# Patient Record
Sex: MALE | Race: WHITE | NOT HISPANIC OR LATINO | Employment: OTHER | URBAN - METROPOLITAN AREA
[De-identification: names, ages, dates, MRNs, and addresses within clinical notes are randomized per-mention and may not be internally consistent; named-entity substitution may affect disease eponyms.]

---

## 2023-02-13 PROBLEM — H04.123 BILATERAL DRY EYES: Status: ACTIVE | Noted: 2023-02-13

## 2023-02-13 PROBLEM — I71.40 AAA (ABDOMINAL AORTIC ANEURYSM) (CMS-HCC): Status: ACTIVE | Noted: 2023-02-13

## 2023-02-13 PROBLEM — H53.40 VISUAL FIELD DEFECT: Status: ACTIVE | Noted: 2023-02-13

## 2023-02-13 PROBLEM — R53.83 OTHER FATIGUE: Status: ACTIVE | Noted: 2023-02-13

## 2023-02-13 PROBLEM — R00.2 PALPITATIONS: Status: ACTIVE | Noted: 2023-02-13

## 2023-02-13 PROBLEM — R93.89 ABNORMAL CXR: Status: ACTIVE | Noted: 2023-02-13

## 2023-02-13 PROBLEM — N39.0 RECURRENT UTI: Status: ACTIVE | Noted: 2023-02-13

## 2023-02-13 PROBLEM — E03.9 ACQUIRED HYPOTHYROIDISM: Status: ACTIVE | Noted: 2023-02-13

## 2023-02-13 PROBLEM — I25.10 ASHD (ARTERIOSCLEROTIC HEART DISEASE): Status: ACTIVE | Noted: 2023-02-13

## 2023-02-13 PROBLEM — I48.91 AFIB (MULTI): Status: ACTIVE | Noted: 2023-02-13

## 2023-02-13 PROBLEM — R79.89 ABNORMAL CBC: Status: ACTIVE | Noted: 2023-02-13

## 2023-02-13 PROBLEM — J90 PLEURAL EFFUSION ON LEFT: Status: ACTIVE | Noted: 2023-02-13

## 2023-02-13 PROBLEM — F32.A DEPRESSIVE DISORDER: Status: ACTIVE | Noted: 2023-02-13

## 2023-02-13 PROBLEM — Z86.73 CEREBELLAR CEREBROVASCULAR ACCIDENT (CVA) WITHOUT LATE EFFECT: Status: ACTIVE | Noted: 2023-02-13

## 2023-02-13 PROBLEM — I10 HTN (HYPERTENSION), BENIGN: Status: ACTIVE | Noted: 2023-02-13

## 2023-02-13 PROBLEM — D49.7 PITUITARY NEOPLASM: Status: ACTIVE | Noted: 2023-02-13

## 2023-02-13 PROBLEM — N18.30 CKD (CHRONIC KIDNEY DISEASE) STAGE 3, GFR 30-59 ML/MIN (MULTI): Status: ACTIVE | Noted: 2023-02-13

## 2023-02-13 PROBLEM — R26.0 ATAXIC GAIT: Status: ACTIVE | Noted: 2023-02-13

## 2023-02-13 PROBLEM — H47.019: Status: ACTIVE | Noted: 2023-02-13

## 2023-02-13 PROBLEM — H26.8 CATARACT, MATURE: Status: ACTIVE | Noted: 2023-02-13

## 2023-02-13 PROBLEM — R26.2 AMBULATORY DYSFUNCTION: Status: ACTIVE | Noted: 2023-02-13

## 2023-02-13 PROBLEM — K21.9 ESOPHAGEAL REFLUX: Status: ACTIVE | Noted: 2023-02-13

## 2023-02-13 PROBLEM — E78.5 HYPERLIPIDEMIA, ACQUIRED: Status: ACTIVE | Noted: 2023-02-13

## 2023-02-13 PROBLEM — N13.8 BPH WITH OBSTRUCTION/LOWER URINARY TRACT SYMPTOMS: Status: ACTIVE | Noted: 2023-02-13

## 2023-02-13 PROBLEM — N40.1 BPH WITH OBSTRUCTION/LOWER URINARY TRACT SYMPTOMS: Status: ACTIVE | Noted: 2023-02-13

## 2023-02-13 RX ORDER — TAMSULOSIN HYDROCHLORIDE 0.4 MG/1
1 CAPSULE ORAL DAILY
COMMUNITY
Start: 2022-06-03 | End: 2023-07-11 | Stop reason: SDUPTHER

## 2023-02-13 RX ORDER — SERTRALINE HYDROCHLORIDE 50 MG/1
1 TABLET, FILM COATED ORAL DAILY
COMMUNITY
Start: 2020-11-24 | End: 2024-03-11 | Stop reason: ALTCHOICE

## 2023-02-13 RX ORDER — LEVOTHYROXINE SODIUM 50 UG/1
1 TABLET ORAL DAILY
COMMUNITY
Start: 2017-11-10 | End: 2024-04-22

## 2023-02-13 RX ORDER — LANOLIN ALCOHOL/MO/W.PET/CERES
1 CREAM (GRAM) TOPICAL DAILY
COMMUNITY
Start: 2019-08-14

## 2023-02-13 RX ORDER — METOPROLOL SUCCINATE 50 MG/1
1 TABLET, EXTENDED RELEASE ORAL DAILY
COMMUNITY
Start: 2019-05-17 | End: 2024-04-19

## 2023-02-13 RX ORDER — ROSUVASTATIN CALCIUM 40 MG/1
1 TABLET, COATED ORAL DAILY
COMMUNITY
Start: 2021-06-28 | End: 2023-07-11 | Stop reason: SDUPTHER

## 2023-02-13 RX ORDER — LOTEPREDNOL ETABONATE 5 MG/ML
SUSPENSION/ DROPS OPHTHALMIC
COMMUNITY
Start: 2022-02-11

## 2023-02-13 RX ORDER — AMLODIPINE BESYLATE 5 MG/1
1 TABLET ORAL DAILY
COMMUNITY
Start: 2019-08-14 | End: 2023-07-11 | Stop reason: ALTCHOICE

## 2023-02-13 RX ORDER — UBIDECARENONE 75 MG
1 CAPSULE ORAL DAILY
COMMUNITY
Start: 2019-08-14

## 2023-02-13 RX ORDER — DILTIAZEM HYDROCHLORIDE 240 MG/1
1 CAPSULE, EXTENDED RELEASE ORAL DAILY
COMMUNITY
Start: 2019-05-17 | End: 2023-07-11 | Stop reason: SDUPTHER

## 2023-02-13 RX ORDER — NITROGLYCERIN 0.4 MG/1
TABLET SUBLINGUAL
COMMUNITY
Start: 2021-05-20 | End: 2024-03-11 | Stop reason: SDUPTHER

## 2023-02-13 RX ORDER — FERROUS SULFATE 325(65) MG
2 TABLET ORAL DAILY
COMMUNITY
Start: 2019-05-17 | End: 2024-03-11 | Stop reason: ALTCHOICE

## 2023-02-13 RX ORDER — PANTOPRAZOLE SODIUM 20 MG/1
1 TABLET, DELAYED RELEASE ORAL DAILY
COMMUNITY
Start: 2019-08-14 | End: 2023-07-24 | Stop reason: SDUPTHER

## 2023-03-09 ENCOUNTER — TELEPHONE (OUTPATIENT)
Dept: PRIMARY CARE | Facility: CLINIC | Age: 83
End: 2023-03-09
Payer: MEDICARE

## 2023-04-11 ENCOUNTER — OFFICE VISIT (OUTPATIENT)
Dept: PRIMARY CARE | Facility: CLINIC | Age: 83
End: 2023-04-11
Payer: MEDICARE

## 2023-04-11 VITALS
HEIGHT: 70 IN | HEART RATE: 60 BPM | SYSTOLIC BLOOD PRESSURE: 122 MMHG | OXYGEN SATURATION: 98 % | DIASTOLIC BLOOD PRESSURE: 72 MMHG | WEIGHT: 203.5 LBS | BODY MASS INDEX: 29.13 KG/M2 | TEMPERATURE: 98.1 F

## 2023-04-11 DIAGNOSIS — I48.91 ATRIAL FIBRILLATION, UNSPECIFIED TYPE (MULTI): ICD-10-CM

## 2023-04-11 DIAGNOSIS — I25.10 ASHD (ARTERIOSCLEROTIC HEART DISEASE): ICD-10-CM

## 2023-04-11 DIAGNOSIS — E78.5 HYPERLIPIDEMIA, ACQUIRED: ICD-10-CM

## 2023-04-11 DIAGNOSIS — I71.40 ABDOMINAL AORTIC ANEURYSM (AAA) WITHOUT RUPTURE, UNSPECIFIED PART (CMS-HCC): ICD-10-CM

## 2023-04-11 DIAGNOSIS — I10 HTN (HYPERTENSION), BENIGN: Primary | ICD-10-CM

## 2023-04-11 DIAGNOSIS — N18.31 STAGE 3A CHRONIC KIDNEY DISEASE (MULTI): ICD-10-CM

## 2023-04-11 DIAGNOSIS — I47.19 PAROXYSMAL ATRIAL TACHYCARDIA (CMS-HCC): ICD-10-CM

## 2023-04-11 DIAGNOSIS — D64.9 ANEMIA, UNSPECIFIED TYPE: ICD-10-CM

## 2023-04-11 DIAGNOSIS — J44.9 CHRONIC OBSTRUCTIVE PULMONARY DISEASE, UNSPECIFIED COPD TYPE (MULTI): ICD-10-CM

## 2023-04-11 DIAGNOSIS — D35.2 PITUITARY MACROADENOMA (MULTI): ICD-10-CM

## 2023-04-11 DIAGNOSIS — I63.9 CEREBELLAR INFARCTION (MULTI): ICD-10-CM

## 2023-04-11 DIAGNOSIS — E03.9 ACQUIRED HYPOTHYROIDISM: ICD-10-CM

## 2023-04-11 PROBLEM — H04.123 BILATERAL DRY EYES: Status: RESOLVED | Noted: 2023-02-13 | Resolved: 2023-04-11

## 2023-04-11 PROBLEM — R26.2 AMBULATORY DYSFUNCTION: Status: RESOLVED | Noted: 2023-02-13 | Resolved: 2023-04-11

## 2023-04-11 PROBLEM — J90 PLEURAL EFFUSION ON LEFT: Status: RESOLVED | Noted: 2023-02-13 | Resolved: 2023-04-11

## 2023-04-11 PROBLEM — F10.10 ALCOHOL ABUSE: Status: RESOLVED | Noted: 2019-04-12 | Resolved: 2023-04-11

## 2023-04-11 PROBLEM — I21.3 ACUTE ST ELEVATION MYOCARDIAL INFARCTION (STEMI) (MULTI): Status: RESOLVED | Noted: 2019-04-12 | Resolved: 2023-04-11

## 2023-04-11 PROBLEM — D49.7 PITUITARY NEOPLASM: Status: RESOLVED | Noted: 2023-02-13 | Resolved: 2023-04-11

## 2023-04-11 PROBLEM — R00.2 PALPITATIONS: Status: RESOLVED | Noted: 2023-02-13 | Resolved: 2023-04-11

## 2023-04-11 PROBLEM — F32.A DEPRESSIVE DISORDER: Status: RESOLVED | Noted: 2023-02-13 | Resolved: 2023-04-11

## 2023-04-11 PROBLEM — I50.9 CHF (CONGESTIVE HEART FAILURE) (MULTI): Status: RESOLVED | Noted: 2019-05-23 | Resolved: 2023-04-11

## 2023-04-11 PROCEDURE — 1160F RVW MEDS BY RX/DR IN RCRD: CPT | Performed by: INTERNAL MEDICINE

## 2023-04-11 PROCEDURE — 99213 OFFICE O/P EST LOW 20 MIN: CPT | Performed by: INTERNAL MEDICINE

## 2023-04-11 PROCEDURE — 3074F SYST BP LT 130 MM HG: CPT | Performed by: INTERNAL MEDICINE

## 2023-04-11 PROCEDURE — 1159F MED LIST DOCD IN RCRD: CPT | Performed by: INTERNAL MEDICINE

## 2023-04-11 PROCEDURE — 3078F DIAST BP <80 MM HG: CPT | Performed by: INTERNAL MEDICINE

## 2023-04-11 RX ORDER — ASPIRIN 81 MG/1
81 TABLET ORAL DAILY
COMMUNITY
End: 2024-03-11 | Stop reason: ALTCHOICE

## 2023-04-11 NOTE — PROGRESS NOTES
"Subjective   Patient ID: Alfredo Quigley is a 82 y.o. male who presents for Follow-up.    HPI 2 weeks ago went to er w/ cp from gerd. Not admitted. Work up neg for cardiac. On ppi . Follow up gerd, hyperlipidemia, ashd, h/o cva, anemia, bph    Review of Systems  No cp, sob  Sees cardio Friday    Objective   /72   Pulse 60   Temp 36.7 °C (98.1 °F)   Ht 1.778 m (5' 10\")   Wt 92.3 kg (203 lb 8 oz)   SpO2 98%   BMI 29.20 kg/m²     Physical Exam  Gen nad, affect wnl  Heentt eomfg, face symmetric, ncat  Neck w/o jvd  Lungs clear   Cv rrr nl s1, s2  Ext w/o edema  Neuro grossly nonfocal  Skin good color    Assessment/Plan   Diagnoses and all orders for this visit:  HTN (hypertension), benign  -     Comprehensive metabolic panel; Future  ASHD (arteriosclerotic heart disease)  -     Lipid Panel; Future  Abdominal aortic aneurysm (AAA) without rupture, unspecified part (CMS/HCC)  Atrial fibrillation, unspecified type (CMS/HCC)  -     Tsh With Reflex To Free T4 If Abnormal; Future  Chronic obstructive pulmonary disease, unspecified COPD type (CMS/HCC)  -     Tsh With Reflex To Free T4 If Abnormal; Future  Pituitary macroadenoma (CMS/HCC)  Cerebellar infarction (CMS/HCC)  -     CBC and Auto Differential; Future  Paroxysmal atrial tachycardia (CMS/HCC)  Stage 3a chronic kidney disease  -     CBC and Auto Differential; Future  Acquired hypothyroidism  Hyperlipidemia, acquired  Anemia, unspecified type  -     Iron and TIBC; Future  -     Ferritin; Future       "

## 2023-04-14 LAB
ALANINE AMINOTRANSFERASE (SGPT) (U/L) IN SER/PLAS: 12 U/L (ref 10–52)
ALBUMIN (G/DL) IN SER/PLAS: 4 G/DL (ref 3.4–5)
ALBUMIN (G/DL) IN SER/PLAS: 4.2 G/DL (ref 3.4–5)
ALKALINE PHOSPHATASE (U/L) IN SER/PLAS: 105 U/L (ref 33–136)
ANION GAP IN SER/PLAS: 13 MMOL/L (ref 10–20)
ANION GAP IN SER/PLAS: 14 MMOL/L (ref 10–20)
ASPARTATE AMINOTRANSFERASE (SGOT) (U/L) IN SER/PLAS: 10 U/L (ref 9–39)
BASOPHILS (10*3/UL) IN BLOOD BY AUTOMATED COUNT: 0.06 X10E9/L (ref 0–0.1)
BASOPHILS/100 LEUKOCYTES IN BLOOD BY AUTOMATED COUNT: 0.9 % (ref 0–2)
BILIRUBIN TOTAL (MG/DL) IN SER/PLAS: 0.5 MG/DL (ref 0–1.2)
CALCIDIOL (25 OH VITAMIN D3) (NG/ML) IN SER/PLAS: 45 NG/ML
CALCIUM (MG/DL) IN SER/PLAS: 9.3 MG/DL (ref 8.6–10.6)
CALCIUM (MG/DL) IN SER/PLAS: 9.5 MG/DL (ref 8.6–10.6)
CARBON DIOXIDE, TOTAL (MMOL/L) IN SER/PLAS: 27 MMOL/L (ref 21–32)
CARBON DIOXIDE, TOTAL (MMOL/L) IN SER/PLAS: 29 MMOL/L (ref 21–32)
CHLORIDE (MMOL/L) IN SER/PLAS: 104 MMOL/L (ref 98–107)
CHLORIDE (MMOL/L) IN SER/PLAS: 104 MMOL/L (ref 98–107)
CHOLESTEROL (MG/DL) IN SER/PLAS: 160 MG/DL (ref 0–199)
CHOLESTEROL IN HDL (MG/DL) IN SER/PLAS: 38.1 MG/DL
CHOLESTEROL/HDL RATIO: 4.2
CREATININE (MG/DL) IN SER/PLAS: 2.04 MG/DL (ref 0.5–1.3)
CREATININE (MG/DL) IN SER/PLAS: 2.04 MG/DL (ref 0.5–1.3)
EOSINOPHILS (10*3/UL) IN BLOOD BY AUTOMATED COUNT: 0.26 X10E9/L (ref 0–0.4)
EOSINOPHILS/100 LEUKOCYTES IN BLOOD BY AUTOMATED COUNT: 3.7 % (ref 0–6)
ERYTHROCYTE DISTRIBUTION WIDTH (RATIO) BY AUTOMATED COUNT: 14.2 % (ref 11.5–14.5)
ERYTHROCYTE MEAN CORPUSCULAR HEMOGLOBIN CONCENTRATION (G/DL) BY AUTOMATED: 31.2 G/DL (ref 32–36)
ERYTHROCYTE MEAN CORPUSCULAR VOLUME (FL) BY AUTOMATED COUNT: 84 FL (ref 80–100)
ERYTHROCYTES (10*6/UL) IN BLOOD BY AUTOMATED COUNT: 4.39 X10E12/L (ref 4.5–5.9)
FERRITIN (UG/LL) IN SER/PLAS: 86 UG/L (ref 20–300)
GFR MALE: 32 ML/MIN/1.73M2
GFR MALE: 32 ML/MIN/1.73M2
GLUCOSE (MG/DL) IN SER/PLAS: 91 MG/DL (ref 74–99)
GLUCOSE (MG/DL) IN SER/PLAS: 95 MG/DL (ref 74–99)
HEMATOCRIT (%) IN BLOOD BY AUTOMATED COUNT: 36.3 % (ref 41–52)
HEMATOCRIT (%) IN BLOOD BY AUTOMATED COUNT: 36.9 % (ref 41–52)
HEMOGLOBIN (G/DL) IN BLOOD: 11.2 G/DL (ref 13.5–17.5)
HEMOGLOBIN (G/DL) IN BLOOD: 11.5 G/DL (ref 13.5–17.5)
IMMATURE GRANULOCYTES/100 LEUKOCYTES IN BLOOD BY AUTOMATED COUNT: 0.3 % (ref 0–0.9)
IRON (UG/DL) IN SER/PLAS: 47 UG/DL (ref 35–150)
IRON BINDING CAPACITY (UG/DL) IN SER/PLAS: 311 UG/DL (ref 240–445)
IRON SATURATION (%) IN SER/PLAS: 15 % (ref 25–45)
LDL: 92 MG/DL (ref 0–99)
LEUKOCYTES (10*3/UL) IN BLOOD BY AUTOMATED COUNT: 7 X10E9/L (ref 4.4–11.3)
LYMPHOCYTES (10*3/UL) IN BLOOD BY AUTOMATED COUNT: 1.23 X10E9/L (ref 0.8–3)
LYMPHOCYTES/100 LEUKOCYTES IN BLOOD BY AUTOMATED COUNT: 17.5 % (ref 13–44)
MONOCYTES (10*3/UL) IN BLOOD BY AUTOMATED COUNT: 0.52 X10E9/L (ref 0.05–0.8)
MONOCYTES/100 LEUKOCYTES IN BLOOD BY AUTOMATED COUNT: 7.4 % (ref 2–10)
NEUTROPHILS (10*3/UL) IN BLOOD BY AUTOMATED COUNT: 4.92 X10E9/L (ref 1.6–5.5)
NEUTROPHILS/100 LEUKOCYTES IN BLOOD BY AUTOMATED COUNT: 70.2 % (ref 40–80)
NRBC (PER 100 WBCS) BY AUTOMATED COUNT: 0 /100 WBC (ref 0–0)
PARATHYRIN INTACT (PG/ML) IN SER/PLAS: 49.2 PG/ML (ref 18.5–88)
PHOSPHATE (MG/DL) IN SER/PLAS: 3.5 MG/DL (ref 2.5–4.9)
PLATELETS (10*3/UL) IN BLOOD AUTOMATED COUNT: 226 X10E9/L (ref 150–450)
POTASSIUM (MMOL/L) IN SER/PLAS: 4.1 MMOL/L (ref 3.5–5.3)
POTASSIUM (MMOL/L) IN SER/PLAS: 4.2 MMOL/L (ref 3.5–5.3)
PROTEIN TOTAL: 7.2 G/DL (ref 6.4–8.2)
SODIUM (MMOL/L) IN SER/PLAS: 141 MMOL/L (ref 136–145)
SODIUM (MMOL/L) IN SER/PLAS: 142 MMOL/L (ref 136–145)
TRIGLYCERIDE (MG/DL) IN SER/PLAS: 150 MG/DL (ref 0–149)
UREA NITROGEN (MG/DL) IN SER/PLAS: 21 MG/DL (ref 6–23)
UREA NITROGEN (MG/DL) IN SER/PLAS: 22 MG/DL (ref 6–23)
VLDL: 30 MG/DL (ref 0–40)

## 2023-04-15 LAB
APPEARANCE, URINE: CLEAR
BILIRUBIN, URINE: NEGATIVE
BLOOD, URINE: NEGATIVE
COLOR, URINE: YELLOW
CREATININE (MG/DL) IN URINE: 108 MG/DL (ref 20–370)
GLUCOSE, URINE: NEGATIVE MG/DL
KETONES, URINE: NEGATIVE MG/DL
LEUKOCYTE ESTERASE, URINE: NEGATIVE
NITRITE, URINE: NEGATIVE
PH, URINE: 6 (ref 5–8)
PROTEIN (MG/DL) IN URINE: 30 MG/DL (ref 5–25)
PROTEIN, URINE: NEGATIVE MG/DL
PROTEIN/CREATININE (MG/MG) IN URINE: 0.28 MG/MG CREAT (ref 0–0.17)
SPECIFIC GRAVITY, URINE: 1.01 (ref 1–1.03)
UROBILINOGEN, URINE: <2 MG/DL (ref 0–1.9)

## 2023-07-11 ENCOUNTER — OFFICE VISIT (OUTPATIENT)
Dept: PRIMARY CARE | Facility: CLINIC | Age: 83
End: 2023-07-11
Payer: MEDICARE

## 2023-07-11 VITALS
OXYGEN SATURATION: 96 % | HEART RATE: 60 BPM | HEIGHT: 70 IN | SYSTOLIC BLOOD PRESSURE: 122 MMHG | TEMPERATURE: 97.3 F | WEIGHT: 203 LBS | DIASTOLIC BLOOD PRESSURE: 72 MMHG | BODY MASS INDEX: 29.06 KG/M2

## 2023-07-11 DIAGNOSIS — Z00.00 ROUTINE GENERAL MEDICAL EXAMINATION AT HEALTH CARE FACILITY: Primary | ICD-10-CM

## 2023-07-11 DIAGNOSIS — N13.8 BPH WITH OBSTRUCTION/LOWER URINARY TRACT SYMPTOMS: ICD-10-CM

## 2023-07-11 DIAGNOSIS — I48.91 ATRIAL FIBRILLATION, UNSPECIFIED TYPE (MULTI): ICD-10-CM

## 2023-07-11 DIAGNOSIS — N40.1 BPH WITH OBSTRUCTION/LOWER URINARY TRACT SYMPTOMS: ICD-10-CM

## 2023-07-11 DIAGNOSIS — E78.5 HYPERLIPIDEMIA, UNSPECIFIED HYPERLIPIDEMIA TYPE: ICD-10-CM

## 2023-07-11 DIAGNOSIS — Z86.73 CEREBELLAR CEREBROVASCULAR ACCIDENT (CVA) WITHOUT LATE EFFECT: ICD-10-CM

## 2023-07-11 PROBLEM — R97.20 BPH WITH ELEVATED PSA: Status: RESOLVED | Noted: 2023-07-11 | Resolved: 2023-07-11

## 2023-07-11 PROBLEM — N40.0 BPH WITH ELEVATED PSA: Status: RESOLVED | Noted: 2023-07-11 | Resolved: 2023-07-11

## 2023-07-11 PROCEDURE — 1159F MED LIST DOCD IN RCRD: CPT | Performed by: INTERNAL MEDICINE

## 2023-07-11 PROCEDURE — 3078F DIAST BP <80 MM HG: CPT | Performed by: INTERNAL MEDICINE

## 2023-07-11 PROCEDURE — 3074F SYST BP LT 130 MM HG: CPT | Performed by: INTERNAL MEDICINE

## 2023-07-11 PROCEDURE — 1160F RVW MEDS BY RX/DR IN RCRD: CPT | Performed by: INTERNAL MEDICINE

## 2023-07-11 PROCEDURE — 1170F FXNL STATUS ASSESSED: CPT | Performed by: INTERNAL MEDICINE

## 2023-07-11 PROCEDURE — G0439 PPPS, SUBSEQ VISIT: HCPCS | Performed by: INTERNAL MEDICINE

## 2023-07-11 PROCEDURE — 99213 OFFICE O/P EST LOW 20 MIN: CPT | Performed by: INTERNAL MEDICINE

## 2023-07-11 RX ORDER — ROSUVASTATIN CALCIUM 40 MG/1
40 TABLET, COATED ORAL DAILY
Qty: 90 TABLET | Refills: 3 | Status: SHIPPED | OUTPATIENT
Start: 2023-07-11 | End: 2024-07-10

## 2023-07-11 RX ORDER — DILTIAZEM HYDROCHLORIDE 240 MG/1
240 CAPSULE, EXTENDED RELEASE ORAL DAILY
Qty: 90 CAPSULE | Refills: 3 | Status: SHIPPED | OUTPATIENT
Start: 2023-07-11 | End: 2023-07-12 | Stop reason: SDUPTHER

## 2023-07-11 RX ORDER — AMLODIPINE BESYLATE 2.5 MG/1
2.5 TABLET ORAL DAILY
COMMUNITY
End: 2024-04-19

## 2023-07-11 RX ORDER — ROSUVASTATIN CALCIUM 40 MG/1
40 TABLET, COATED ORAL DAILY
Qty: 14 TABLET | Refills: 1 | Status: SHIPPED | OUTPATIENT
Start: 2023-07-11 | End: 2024-03-11 | Stop reason: WASHOUT

## 2023-07-11 RX ORDER — TAMSULOSIN HYDROCHLORIDE 0.4 MG/1
0.4 CAPSULE ORAL DAILY
Qty: 90 CAPSULE | Refills: 3 | Status: SHIPPED | OUTPATIENT
Start: 2023-07-11 | End: 2024-03-11 | Stop reason: SDUPTHER

## 2023-07-11 ASSESSMENT — ACTIVITIES OF DAILY LIVING (ADL)
BATHING: NEEDS ASSISTANCE
GROCERY_SHOPPING: NEEDS ASSISTANCE
DOING_HOUSEWORK: NEEDS ASSISTANCE
MANAGING_FINANCES: NEEDS ASSISTANCE
DRESSING: NEEDS ASSISTANCE
TAKING_MEDICATION: NEEDS ASSISTANCE

## 2023-07-11 ASSESSMENT — ENCOUNTER SYMPTOMS
LOSS OF SENSATION IN FEET: 0
DEPRESSION: 0
OCCASIONAL FEELINGS OF UNSTEADINESS: 1

## 2023-07-11 ASSESSMENT — PATIENT HEALTH QUESTIONNAIRE - PHQ9
1. LITTLE INTEREST OR PLEASURE IN DOING THINGS: NOT AT ALL
SUM OF ALL RESPONSES TO PHQ9 QUESTIONS 1 AND 2: 0
2. FEELING DOWN, DEPRESSED OR HOPELESS: NOT AT ALL

## 2023-07-11 NOTE — PROGRESS NOTES
"Subjective   Reason for Visit: Alfredo Quigley is an 83 y.o. male here for a Medicare Wellness visit.     Past Medical, Surgical, and Family History reviewed and updated in chart.    Reviewed all medications by prescribing practitioner or clinical pharmacist (such as prescriptions, OTCs, herbal therapies and supplements) and documented in the medical record.    HPI follow up htn, hyperlipidemia, ashd    Patient Care Team:  Jose Matos MD as PCP - General  [facility]     Review of Systems  Not using etoh. No pain anywhere.  Objective   Vitals:  /72   Pulse 60   Temp 36.3 °C (97.3 °F)   Ht 1.778 m (5' 10\")   Wt 92.1 kg (203 lb)   SpO2 96%   BMI 29.13 kg/m²       Physical Exam  GEN nad, Affect wnl  Heent ncat, face symmetric  Skin good color  Resp breathing easily  No p/m retardation or agitation  Thinking appropriate  Oriented    Assessment/Plan   Medicare wellness  Htn  Ckd  Afib  Hyperlipidemia    Follow up 6 mos w/ labs       "

## 2023-07-12 DIAGNOSIS — I48.91 ATRIAL FIBRILLATION, UNSPECIFIED TYPE (MULTI): ICD-10-CM

## 2023-07-12 RX ORDER — DILTIAZEM HYDROCHLORIDE 240 MG/1
240 CAPSULE, EXTENDED RELEASE ORAL DAILY
Qty: 90 CAPSULE | Refills: 3 | Status: SHIPPED | OUTPATIENT
Start: 2023-07-12 | End: 2023-07-24 | Stop reason: SDUPTHER

## 2023-07-17 ENCOUNTER — TELEPHONE (OUTPATIENT)
Dept: PRIMARY CARE | Facility: CLINIC | Age: 83
End: 2023-07-17
Payer: MEDICARE

## 2023-07-17 DIAGNOSIS — R41.0 CONFUSION: Primary | ICD-10-CM

## 2023-07-19 ENCOUNTER — LAB (OUTPATIENT)
Dept: LAB | Facility: LAB | Age: 83
End: 2023-07-19
Payer: MEDICARE

## 2023-07-19 DIAGNOSIS — R41.0 CONFUSION: ICD-10-CM

## 2023-07-19 LAB
APPEARANCE, URINE: ABNORMAL
BACTERIA, URINE: ABNORMAL /HPF
BILIRUBIN, URINE: NEGATIVE
BLOOD, URINE: NEGATIVE
CALCIUM OXALATE CRYSTALS, URINE: ABNORMAL /HPF
COLOR, URINE: ABNORMAL
GLUCOSE, URINE: NEGATIVE MG/DL
KETONES, URINE: NEGATIVE MG/DL
LEUKOCYTE ESTERASE, URINE: NEGATIVE
MUCUS, URINE: ABNORMAL /LPF
NITRITE, URINE: NEGATIVE
PH, URINE: 5 (ref 5–8)
PROTEIN, URINE: ABNORMAL MG/DL
RBC, URINE: 1 /HPF (ref 0–5)
SPECIFIC GRAVITY, URINE: 1.02 (ref 1–1.03)
SQUAMOUS EPITHELIAL CELLS, URINE: 1 /HPF
UROBILINOGEN, URINE: <2 MG/DL (ref 0–1.9)
WBC, URINE: 3 /HPF (ref 0–5)

## 2023-07-19 PROCEDURE — 87086 URINE CULTURE/COLONY COUNT: CPT

## 2023-07-19 PROCEDURE — 81001 URINALYSIS AUTO W/SCOPE: CPT

## 2023-07-20 LAB — URINE CULTURE: NORMAL

## 2023-07-24 DIAGNOSIS — I48.91 ATRIAL FIBRILLATION, UNSPECIFIED TYPE (MULTI): ICD-10-CM

## 2023-07-24 DIAGNOSIS — K21.9 GASTROESOPHAGEAL REFLUX DISEASE WITHOUT ESOPHAGITIS: Primary | ICD-10-CM

## 2023-07-24 RX ORDER — DILTIAZEM HYDROCHLORIDE 240 MG/1
240 CAPSULE, EXTENDED RELEASE ORAL DAILY
Qty: 90 CAPSULE | Refills: 3 | Status: SHIPPED | OUTPATIENT
Start: 2023-07-24 | End: 2024-07-23

## 2023-07-24 RX ORDER — PANTOPRAZOLE SODIUM 20 MG/1
20 TABLET, DELAYED RELEASE ORAL DAILY
Qty: 90 TABLET | Refills: 3 | Status: SHIPPED | OUTPATIENT
Start: 2023-07-24 | End: 2024-07-23

## 2023-10-05 ENCOUNTER — LAB (OUTPATIENT)
Dept: LAB | Facility: LAB | Age: 83
End: 2023-10-05
Payer: MEDICARE

## 2023-10-05 DIAGNOSIS — E55.9 VITAMIN D DEFICIENCY, UNSPECIFIED: ICD-10-CM

## 2023-10-05 DIAGNOSIS — D50.9 IRON DEFICIENCY ANEMIA, UNSPECIFIED: ICD-10-CM

## 2023-10-05 DIAGNOSIS — N18.32 CHRONIC KIDNEY DISEASE, STAGE 3B (MULTI): ICD-10-CM

## 2023-10-05 DIAGNOSIS — N18.32 CHRONIC KIDNEY DISEASE, STAGE 3B (MULTI): Primary | ICD-10-CM

## 2023-10-05 PROCEDURE — 36415 COLL VENOUS BLD VENIPUNCTURE: CPT

## 2023-10-05 PROCEDURE — 83970 ASSAY OF PARATHORMONE: CPT

## 2023-10-05 PROCEDURE — 80069 RENAL FUNCTION PANEL: CPT

## 2023-10-05 PROCEDURE — 83540 ASSAY OF IRON: CPT

## 2023-10-05 PROCEDURE — 85014 HEMATOCRIT: CPT

## 2023-10-05 PROCEDURE — 83550 IRON BINDING TEST: CPT

## 2023-10-05 PROCEDURE — 85018 HEMOGLOBIN: CPT

## 2023-10-05 PROCEDURE — 82728 ASSAY OF FERRITIN: CPT

## 2023-10-05 PROCEDURE — 82306 VITAMIN D 25 HYDROXY: CPT

## 2023-10-06 LAB
25(OH)D3 SERPL-MCNC: 48 NG/ML (ref 30–100)
ALBUMIN SERPL BCP-MCNC: 4.1 G/DL (ref 3.4–5)
ANION GAP SERPL CALC-SCNC: 14 MMOL/L (ref 10–20)
BUN SERPL-MCNC: 25 MG/DL (ref 6–23)
CALCIUM SERPL-MCNC: 9.4 MG/DL (ref 8.6–10.6)
CHLORIDE SERPL-SCNC: 107 MMOL/L (ref 98–107)
CO2 SERPL-SCNC: 27 MMOL/L (ref 21–32)
CREAT SERPL-MCNC: 2.07 MG/DL (ref 0.5–1.3)
FERRITIN SERPL-MCNC: 124 NG/ML (ref 20–300)
GFR SERPL CREATININE-BSD FRML MDRD: 31 ML/MIN/1.73M*2
GLUCOSE SERPL-MCNC: 168 MG/DL (ref 74–99)
HCT VFR BLD AUTO: 37.1 % (ref 41–52)
HGB BLD-MCNC: 11.4 G/DL (ref 13.5–17.5)
IRON SATN MFR SERPL: 16 % (ref 25–45)
IRON SERPL-MCNC: 49 UG/DL (ref 35–150)
PHOSPHATE SERPL-MCNC: 2.6 MG/DL (ref 2.5–4.9)
POTASSIUM SERPL-SCNC: 3.8 MMOL/L (ref 3.5–5.3)
PTH-INTACT SERPL-MCNC: 59.6 PG/ML (ref 18.5–88)
SODIUM SERPL-SCNC: 144 MMOL/L (ref 136–145)
TIBC SERPL-MCNC: 307 UG/DL (ref 240–445)
UIBC SERPL-MCNC: 258 UG/DL (ref 110–370)

## 2023-12-15 ENCOUNTER — APPOINTMENT (OUTPATIENT)
Dept: CARDIOLOGY | Facility: CLINIC | Age: 83
End: 2023-12-15
Payer: MEDICARE

## 2023-12-15 PROBLEM — I10 ESSENTIAL HYPERTENSION: Status: ACTIVE | Noted: 2019-04-01

## 2023-12-15 PROBLEM — I48.20 CHRONIC ATRIAL FIBRILLATION (MULTI): Status: ACTIVE | Noted: 2019-04-01

## 2023-12-18 ENCOUNTER — APPOINTMENT (OUTPATIENT)
Dept: CARDIOLOGY | Facility: CLINIC | Age: 83
End: 2023-12-18
Payer: MEDICARE

## 2024-01-11 ENCOUNTER — APPOINTMENT (OUTPATIENT)
Dept: PRIMARY CARE | Facility: CLINIC | Age: 84
End: 2024-01-11
Payer: MEDICARE

## 2024-02-09 ENCOUNTER — APPOINTMENT (OUTPATIENT)
Dept: CARDIOLOGY | Facility: CLINIC | Age: 84
End: 2024-02-09
Payer: MEDICARE

## 2024-03-05 ENCOUNTER — LAB (OUTPATIENT)
Dept: LAB | Facility: LAB | Age: 84
End: 2024-03-05
Payer: MEDICARE

## 2024-03-05 DIAGNOSIS — N13.8 BPH WITH OBSTRUCTION/LOWER URINARY TRACT SYMPTOMS: ICD-10-CM

## 2024-03-05 DIAGNOSIS — N40.1 BPH WITH OBSTRUCTION/LOWER URINARY TRACT SYMPTOMS: ICD-10-CM

## 2024-03-05 DIAGNOSIS — N18.32 CHRONIC KIDNEY DISEASE, STAGE 3B (MULTI): ICD-10-CM

## 2024-03-05 DIAGNOSIS — D50.9 IRON DEFICIENCY ANEMIA, UNSPECIFIED: ICD-10-CM

## 2024-03-05 DIAGNOSIS — E78.5 HYPERLIPIDEMIA, UNSPECIFIED HYPERLIPIDEMIA TYPE: ICD-10-CM

## 2024-03-05 DIAGNOSIS — Z86.73 CEREBELLAR CEREBROVASCULAR ACCIDENT (CVA) WITHOUT LATE EFFECT: ICD-10-CM

## 2024-03-05 DIAGNOSIS — I48.91 ATRIAL FIBRILLATION, UNSPECIFIED TYPE (MULTI): ICD-10-CM

## 2024-03-05 DIAGNOSIS — E55.9 VITAMIN D DEFICIENCY, UNSPECIFIED: ICD-10-CM

## 2024-03-05 LAB
ALBUMIN SERPL BCP-MCNC: 4 G/DL (ref 3.4–5)
ALP SERPL-CCNC: 111 U/L (ref 33–136)
ALT SERPL W P-5'-P-CCNC: 21 U/L (ref 10–52)
ANION GAP SERPL CALC-SCNC: 9 MMOL/L (ref 10–20)
AST SERPL W P-5'-P-CCNC: 11 U/L (ref 9–39)
BILIRUB SERPL-MCNC: 0.5 MG/DL (ref 0–1.2)
BUN SERPL-MCNC: 22 MG/DL (ref 6–23)
CALCIUM SERPL-MCNC: 9.5 MG/DL (ref 8.6–10.6)
CHLORIDE SERPL-SCNC: 104 MMOL/L (ref 98–107)
CHOLEST SERPL-MCNC: 151 MG/DL (ref 0–199)
CHOLESTEROL/HDL RATIO: 3.8
CO2 SERPL-SCNC: 32 MMOL/L (ref 21–32)
CREAT SERPL-MCNC: 2.01 MG/DL (ref 0.5–1.3)
CREAT UR-MCNC: 78.6 MG/DL (ref 20–370)
EGFRCR SERPLBLD CKD-EPI 2021: 33 ML/MIN/1.73M*2
GLUCOSE SERPL-MCNC: 87 MG/DL (ref 74–99)
HDLC SERPL-MCNC: 39.8 MG/DL
LDLC SERPL CALC-MCNC: 89 MG/DL
NON HDL CHOLESTEROL: 111 MG/DL (ref 0–149)
POTASSIUM SERPL-SCNC: 3.9 MMOL/L (ref 3.5–5.3)
PROT SERPL-MCNC: 7.2 G/DL (ref 6.4–8.2)
PROT UR-ACNC: 19 MG/DL (ref 5–25)
PROT/CREAT UR: 0.24 MG/MG CREAT (ref 0–0.17)
RBC #/AREA URNS AUTO: NORMAL /HPF
SODIUM SERPL-SCNC: 141 MMOL/L (ref 136–145)
TRIGL SERPL-MCNC: 111 MG/DL (ref 0–149)
TSH SERPL-ACNC: 3.56 MIU/L (ref 0.44–3.98)
VLDL: 22 MG/DL (ref 0–40)
WBC #/AREA URNS AUTO: NORMAL /HPF

## 2024-03-05 PROCEDURE — 81001 URINALYSIS AUTO W/SCOPE: CPT

## 2024-03-05 PROCEDURE — 80053 COMPREHEN METABOLIC PANEL: CPT

## 2024-03-05 PROCEDURE — 82570 ASSAY OF URINE CREATININE: CPT

## 2024-03-05 PROCEDURE — 80061 LIPID PANEL: CPT

## 2024-03-05 PROCEDURE — 84156 ASSAY OF PROTEIN URINE: CPT

## 2024-03-05 PROCEDURE — 84443 ASSAY THYROID STIM HORMONE: CPT

## 2024-03-05 PROCEDURE — 36415 COLL VENOUS BLD VENIPUNCTURE: CPT

## 2024-03-11 ENCOUNTER — OFFICE VISIT (OUTPATIENT)
Dept: PRIMARY CARE | Facility: CLINIC | Age: 84
End: 2024-03-11
Payer: MEDICARE

## 2024-03-11 VITALS
DIASTOLIC BLOOD PRESSURE: 70 MMHG | HEART RATE: 53 BPM | BODY MASS INDEX: 29.06 KG/M2 | HEIGHT: 70 IN | TEMPERATURE: 97.1 F | WEIGHT: 203 LBS | SYSTOLIC BLOOD PRESSURE: 120 MMHG | OXYGEN SATURATION: 99 %

## 2024-03-11 DIAGNOSIS — N18.31 STAGE 3A CHRONIC KIDNEY DISEASE (MULTI): ICD-10-CM

## 2024-03-11 DIAGNOSIS — M72.0 DUPUYTREN CONTRACTURE: ICD-10-CM

## 2024-03-11 DIAGNOSIS — R44.3 HALLUCINATIONS: ICD-10-CM

## 2024-03-11 DIAGNOSIS — G44.89 OTHER HEADACHE SYNDROME: Primary | ICD-10-CM

## 2024-03-11 DIAGNOSIS — D35.2 PITUITARY MACROADENOMA (MULTI): ICD-10-CM

## 2024-03-11 DIAGNOSIS — N13.8 BPH WITH OBSTRUCTION/LOWER URINARY TRACT SYMPTOMS: ICD-10-CM

## 2024-03-11 DIAGNOSIS — R41.0 CONFUSION: ICD-10-CM

## 2024-03-11 DIAGNOSIS — I71.40 ABDOMINAL AORTIC ANEURYSM (AAA) WITHOUT RUPTURE, UNSPECIFIED PART (CMS-HCC): ICD-10-CM

## 2024-03-11 DIAGNOSIS — N40.1 BPH WITH OBSTRUCTION/LOWER URINARY TRACT SYMPTOMS: ICD-10-CM

## 2024-03-11 DIAGNOSIS — I25.10 ASHD (ARTERIOSCLEROTIC HEART DISEASE): ICD-10-CM

## 2024-03-11 DIAGNOSIS — N18.32 CHRONIC KIDNEY DISEASE, STAGE 3B (MULTI): ICD-10-CM

## 2024-03-11 DIAGNOSIS — D64.9 ANEMIA, UNSPECIFIED TYPE: ICD-10-CM

## 2024-03-11 DIAGNOSIS — J44.9 CHRONIC OBSTRUCTIVE PULMONARY DISEASE, UNSPECIFIED COPD TYPE (MULTI): ICD-10-CM

## 2024-03-11 DIAGNOSIS — I48.91 ATRIAL FIBRILLATION, UNSPECIFIED TYPE (MULTI): ICD-10-CM

## 2024-03-11 PROCEDURE — 3078F DIAST BP <80 MM HG: CPT | Performed by: INTERNAL MEDICINE

## 2024-03-11 PROCEDURE — 3074F SYST BP LT 130 MM HG: CPT | Performed by: INTERNAL MEDICINE

## 2024-03-11 PROCEDURE — 1159F MED LIST DOCD IN RCRD: CPT | Performed by: INTERNAL MEDICINE

## 2024-03-11 PROCEDURE — 99214 OFFICE O/P EST MOD 30 MIN: CPT | Performed by: INTERNAL MEDICINE

## 2024-03-11 RX ORDER — NITROGLYCERIN 0.4 MG/1
0.4 TABLET SUBLINGUAL EVERY 5 MIN PRN
Qty: 25 TABLET | Refills: 3 | Status: SHIPPED | OUTPATIENT
Start: 2024-03-11 | End: 2025-03-11

## 2024-03-11 RX ORDER — TAMSULOSIN HYDROCHLORIDE 0.4 MG/1
0.4 CAPSULE ORAL DAILY
Qty: 90 CAPSULE | Refills: 3 | Status: SHIPPED | OUTPATIENT
Start: 2024-03-11 | End: 2025-03-11

## 2024-03-11 NOTE — PROGRESS NOTES
"Subjective   Patient ID: Alfredo Quigley is a 83 y.o. male who presents for Follow-up.    HPI worse balance; headaches  Not on zoloft . Stopped on own  Some bad dreams vs hs hallucinations    Review of Systems  As above    Objective   /70   Pulse 53   Temp 36.2 °C (97.1 °F)   Ht 1.778 m (5' 10\")   Wt 92.1 kg (203 lb)   SpO2 99%   BMI 29.13 kg/m²     Physical Exam  GEN nad, Affect wnl  Heent ncat, eomfg, face symmetric  Skin good color  Resp breathing easily  No p/m retardation or agitation  Thinking appropriate  Oriented  [facility]    Assessment/Plan   Diagnoses and all orders for this visit:  Other headache syndrome  -     MR brain wo IV contrast; Future  Chronic obstructive pulmonary disease, unspecified COPD type (CMS/HCC)  Atrial fibrillation, unspecified type (CMS/HCC)  Abdominal aortic aneurysm (AAA) without rupture, unspecified part (CMS/HCC)  Pituitary macroadenoma (CMS/HCC)  -     MR brain wo IV contrast; Future  Stage 3a chronic kidney disease (CMS/HCC)  Chronic kidney disease, stage 3b (CMS/HCC)  ASHD (arteriosclerotic heart disease)  -     nitroglycerin (Nitrostat) 0.4 mg SL tablet; Place 1 tablet (0.4 mg) under the tongue every 5 minutes if needed for chest pain.  Hallucinations  -     Urinalysis with Reflex Microscopic; Future  -     Urine Culture; Future  -     CBC and Auto Differential; Future  Anemia, unspecified type  -     Iron and TIBC; Future  -     Ferritin; Future  -     Vitamin B12; Future  Confusion  -     Urine Culture; Future  BPH with obstruction/lower urinary tract symptoms  -     tamsulosin (Flomax) 0.4 mg 24 hr capsule; Take 1 capsule (0.4 mg) by mouth once daily.  Dupuytren contracture  -     Referral to Orthopaedic Surgery; Future       "

## 2024-03-12 ENCOUNTER — HOSPITAL ENCOUNTER (OUTPATIENT)
Dept: RADIOLOGY | Facility: CLINIC | Age: 84
End: 2024-03-12
Payer: MEDICARE

## 2024-03-16 ENCOUNTER — HOSPITAL ENCOUNTER (OUTPATIENT)
Dept: RADIOLOGY | Facility: HOSPITAL | Age: 84
Discharge: HOME | End: 2024-03-16
Payer: MEDICARE

## 2024-03-16 DIAGNOSIS — D35.2 PITUITARY MACROADENOMA (MULTI): ICD-10-CM

## 2024-03-16 DIAGNOSIS — G44.89 OTHER HEADACHE SYNDROME: ICD-10-CM

## 2024-03-16 PROCEDURE — 70551 MRI BRAIN STEM W/O DYE: CPT

## 2024-03-16 PROCEDURE — 70551 MRI BRAIN STEM W/O DYE: CPT | Performed by: RADIOLOGY

## 2024-03-25 ENCOUNTER — LAB (OUTPATIENT)
Dept: LAB | Facility: LAB | Age: 84
End: 2024-03-25
Payer: MEDICARE

## 2024-03-25 DIAGNOSIS — R44.3 HALLUCINATIONS: ICD-10-CM

## 2024-03-25 DIAGNOSIS — D64.9 ANEMIA, UNSPECIFIED TYPE: ICD-10-CM

## 2024-03-25 PROCEDURE — 83540 ASSAY OF IRON: CPT

## 2024-03-25 PROCEDURE — 85025 COMPLETE CBC W/AUTO DIFF WBC: CPT

## 2024-03-25 PROCEDURE — 83550 IRON BINDING TEST: CPT

## 2024-03-25 PROCEDURE — 82728 ASSAY OF FERRITIN: CPT

## 2024-03-25 PROCEDURE — 81001 URINALYSIS AUTO W/SCOPE: CPT

## 2024-03-25 PROCEDURE — 36415 COLL VENOUS BLD VENIPUNCTURE: CPT

## 2024-03-25 PROCEDURE — 82607 VITAMIN B-12: CPT

## 2024-03-26 LAB
APPEARANCE UR: CLEAR
BASOPHILS # BLD AUTO: 0.07 X10*3/UL (ref 0–0.1)
BASOPHILS NFR BLD AUTO: 0.9 %
BILIRUB UR STRIP.AUTO-MCNC: NEGATIVE MG/DL
COLOR UR: ABNORMAL
EOSINOPHIL # BLD AUTO: 0.29 X10*3/UL (ref 0–0.4)
EOSINOPHIL NFR BLD AUTO: 3.8 %
ERYTHROCYTE [DISTWIDTH] IN BLOOD BY AUTOMATED COUNT: 14.1 % (ref 11.5–14.5)
FERRITIN SERPL-MCNC: 178 NG/ML (ref 20–300)
GLUCOSE UR STRIP.AUTO-MCNC: NORMAL MG/DL
HCT VFR BLD AUTO: 37.1 % (ref 41–52)
HGB BLD-MCNC: 11.7 G/DL (ref 13.5–17.5)
IMM GRANULOCYTES # BLD AUTO: 0.03 X10*3/UL (ref 0–0.5)
IMM GRANULOCYTES NFR BLD AUTO: 0.4 % (ref 0–0.9)
IRON SATN MFR SERPL: 19 % (ref 25–45)
IRON SERPL-MCNC: 58 UG/DL (ref 35–150)
KETONES UR STRIP.AUTO-MCNC: NEGATIVE MG/DL
LEUKOCYTE ESTERASE UR QL STRIP.AUTO: NEGATIVE
LYMPHOCYTES # BLD AUTO: 1.79 X10*3/UL (ref 0.8–3)
LYMPHOCYTES NFR BLD AUTO: 23.2 %
MCH RBC QN AUTO: 27 PG (ref 26–34)
MCHC RBC AUTO-ENTMCNC: 31.5 G/DL (ref 32–36)
MCV RBC AUTO: 86 FL (ref 80–100)
MONOCYTES # BLD AUTO: 0.61 X10*3/UL (ref 0.05–0.8)
MONOCYTES NFR BLD AUTO: 7.9 %
MUCOUS THREADS #/AREA URNS AUTO: NORMAL /LPF
NEUTROPHILS # BLD AUTO: 4.91 X10*3/UL (ref 1.6–5.5)
NEUTROPHILS NFR BLD AUTO: 63.8 %
NITRITE UR QL STRIP.AUTO: NEGATIVE
NRBC BLD-RTO: 0 /100 WBCS (ref 0–0)
PH UR STRIP.AUTO: 7 [PH]
PLATELET # BLD AUTO: 241 X10*3/UL (ref 150–450)
PROT UR STRIP.AUTO-MCNC: NEGATIVE MG/DL
RBC # BLD AUTO: 4.33 X10*6/UL (ref 4.5–5.9)
RBC # UR STRIP.AUTO: ABNORMAL /UL
RBC #/AREA URNS AUTO: NORMAL /HPF
SP GR UR STRIP.AUTO: 1.01
SQUAMOUS #/AREA URNS AUTO: NORMAL /HPF
TIBC SERPL-MCNC: 311 UG/DL (ref 240–445)
UIBC SERPL-MCNC: 253 UG/DL (ref 110–370)
UROBILINOGEN UR STRIP.AUTO-MCNC: NORMAL MG/DL
VIT B12 SERPL-MCNC: 923 PG/ML (ref 211–911)
WBC # BLD AUTO: 7.7 X10*3/UL (ref 4.4–11.3)
WBC #/AREA URNS AUTO: NORMAL /HPF

## 2024-03-28 ENCOUNTER — OFFICE VISIT (OUTPATIENT)
Dept: PRIMARY CARE | Facility: CLINIC | Age: 84
End: 2024-03-28
Payer: MEDICARE

## 2024-03-28 VITALS
DIASTOLIC BLOOD PRESSURE: 72 MMHG | TEMPERATURE: 97.1 F | OXYGEN SATURATION: 97 % | HEIGHT: 70 IN | SYSTOLIC BLOOD PRESSURE: 114 MMHG | WEIGHT: 210 LBS | HEART RATE: 52 BPM | BODY MASS INDEX: 30.06 KG/M2

## 2024-03-28 DIAGNOSIS — H47.019 NON-ARTERITIC ANTERIOR ISCHEMIC OPTIC NEUROPATHY, UNSPECIFIED LATERALITY: ICD-10-CM

## 2024-03-28 DIAGNOSIS — D35.2 PITUITARY MACROADENOMA (MULTI): Primary | ICD-10-CM

## 2024-03-28 DIAGNOSIS — I71.40 ABDOMINAL AORTIC ANEURYSM (AAA) WITHOUT RUPTURE, UNSPECIFIED PART (CMS-HCC): ICD-10-CM

## 2024-03-28 DIAGNOSIS — E78.5 HYPERLIPIDEMIA, ACQUIRED: ICD-10-CM

## 2024-03-28 DIAGNOSIS — R26.0 ATAXIC GAIT: ICD-10-CM

## 2024-03-28 DIAGNOSIS — I25.10 ASHD (ARTERIOSCLEROTIC HEART DISEASE): ICD-10-CM

## 2024-03-28 DIAGNOSIS — I10 ESSENTIAL HYPERTENSION: ICD-10-CM

## 2024-03-28 DIAGNOSIS — I48.20 CHRONIC ATRIAL FIBRILLATION (MULTI): ICD-10-CM

## 2024-03-28 DIAGNOSIS — J44.9 CHRONIC OBSTRUCTIVE PULMONARY DISEASE, UNSPECIFIED COPD TYPE (MULTI): ICD-10-CM

## 2024-03-28 DIAGNOSIS — N18.32 CHRONIC KIDNEY DISEASE, STAGE 3B (MULTI): ICD-10-CM

## 2024-03-28 DIAGNOSIS — Z86.73 CEREBELLAR CEREBROVASCULAR ACCIDENT (CVA) WITHOUT LATE EFFECT: ICD-10-CM

## 2024-03-28 PROCEDURE — 3078F DIAST BP <80 MM HG: CPT | Performed by: INTERNAL MEDICINE

## 2024-03-28 PROCEDURE — 1036F TOBACCO NON-USER: CPT | Performed by: INTERNAL MEDICINE

## 2024-03-28 PROCEDURE — 99215 OFFICE O/P EST HI 40 MIN: CPT | Performed by: INTERNAL MEDICINE

## 2024-03-28 PROCEDURE — 3074F SYST BP LT 130 MM HG: CPT | Performed by: INTERNAL MEDICINE

## 2024-03-28 PROCEDURE — 1159F MED LIST DOCD IN RCRD: CPT | Performed by: INTERNAL MEDICINE

## 2024-03-28 NOTE — PROGRESS NOTES
"Subjective   Patient ID: Alfredo Quigley is a 83 y.o. male who presents for Follow-up.    HPI fu headaches worse of late and worse chronic balance problems.  H/o pituitary macroadenoma dx 2011.  Previous max measurement 2.3 cm on mri recently enlarged to 2.7 cm. Some encroachment toward left optic nerve. Pt also has had multiple lacunar cvas and has some localized atrophy.  There is a question of some dural thickening.  Gadolinium not done due to ckd . Phx remarkable in a addition to above for ashd, hyperlipidemia, gerd, htn, ckd creat 2, afib on apixaban, hypothyroidism, bph, anemia of ckd. Vision poor peripheral, r eye blind. Notably, pt was seen in past by dr kash richards and was referred to neurosurgery but apparently never went.    Review of Systems  Off ssri for a while. Depression ok    Objective   /72   Pulse 52   Temp 36.2 °C (97.1 °F)   Ht 1.778 m (5' 10\")   Wt 95.3 kg (210 lb)   SpO2 97%   BMI 30.13 kg/m²     Physical Exam  GEN nad, Affect wnl  Heent ncat, r exotropia, face symmetric  Skin good color  Resp breathing easily  No p/m retardation or agitation  Thinking appropriate  Oriented  Ext trace edemA  Mri and labs reviewed  Old notes dating back to 2015 reviewed    Assessment/Plan   Diagnoses and all orders for this visit:  Pituitary macroadenoma (CMS/HCC). Enlarging w/ some headaches, poor vision, worse balance.  Dural Thickening ?  Non-arteritic anterior ischemic optic neuropathy, unspecified laterality  Chronic atrial fibrillation (CMS/HCC)  Abdominal aortic aneurysm (AAA) without rupture, unspecified part (CMS/HCC)  Ataxic gait  ASHD (arteriosclerotic heart disease)  Cerebellar cerebrovascular accident (CVA) without late effect  Chronic kidney disease, stage 3b (CMS/HCC)  Essential hypertension  Hyperlipidemia, acquired  Chronic obstructive pulmonary disease, unspecified COPD type (CMS/HCC)    Diary w/ dreams/hallucinations. Document date/time/predisposing factors  Refer dr holt " neurosurgery opinion. Enlarging pituitary macroadenoma and thickened dura on mri w/ headaches and worse balance.  Fu 2 mos

## 2024-04-09 ENCOUNTER — APPOINTMENT (OUTPATIENT)
Dept: NEUROSURGERY | Facility: HOSPITAL | Age: 84
End: 2024-04-09
Payer: MEDICARE

## 2024-04-09 NOTE — PROGRESS NOTES
[facility]  Neurosurgery    History of Present Illness      Alfredo Quigley is an 83-year-old male with a PMH significant for chronic atrial fibrillation (Eliquis), HTN, HLD, hypothyroidism, BPH, anemia of chronic disease, CKD stage III, with a known pituitary macro adenoma that was initially diagnosed in 2011 but has not been seen by neurosurgery for evaluation / management since an initial evaluation. Patient with R eye blindness and poor peripheral vision. Was following with Dr. Salazar and Dr. Whiting of ophthalmology but unknown last visit. 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 Has not followed up since. No recent pituitary laboratory workup available for viewing. Patient's las MRI with gadolinium was in 2018 where he was noted to have a 15 x 23mm pituitary macroadenoma with encroachment into the R cavernous sinus. Per endocrinology report initial imaging reviewed a pituitary macroadenoma measuring 1.6 x 2.3 x 1.5cm in 2011 that had remained stable until 2015 when it was noted to be contacting the L aspect of the OC. His most recent MR without contrast demonstrated a 2.1 x 2.7x 1.4cm homogenously enhancing sellar mass on T2 with slight suprasellar involvement. Continued contact of L optic nerve and optic apparatus. Given concern for growth patient was recommended for neurosurgical evaluation and presents to clinic for NPV.                   Objective      Vitals:   There were no vitals taken for this visit.        Physical Exam:            Relevant Results:            Assessment & Plan      Diagnosis:  Diagnoses and all orders for this visit:  Pituitary macroadenoma (CMS/[facility])          Provider Impression:         Medical History     Past Medical History:   Diagnosis Date    Acute ST elevation myocardial infarction (STEMI) (CMS/HCC) 04/12/2019    Alcohol abuse 04/12/2019     Benign neoplasm of pituitary gland (CMS/HCC)     Pituitary adenoma    Bilateral dry eyes 02/13/2023    BPH with elevated PSA 07/11/2023    Depressive disorder 02/13/2023    Disorder of thyroid, unspecified     Thyroid trouble    Ischemic optic neuropathy, 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    Ischemic optic neuropathy    Old myocardial infarction 06/18/2020    History of myocardial infarction    Personal history of diseases of the skin and subcutaneous tissue 12/03/2019    History of cellulitis    Personal history of other diseases of the circulatory system     History of atrial fibrillation    Personal history of other specified conditions     History of breast lump    Pleural effusion on left 02/13/2023    Pure hypercholesterolemia, unspecified     High cholesterol    Renal insufficiency 09/15/2015    Vitamin D deficiency 09/15/2015     Past Surgical History:   Procedure Laterality Date    AV NODE ABLATION  11/10/2017    Catheter Ablation Atrial Fibrillation    OTHER SURGICAL HISTORY  05/17/2019    Cardiac catheterization with stent placement     Social History     Tobacco Use    Smoking status: Former     Types: Cigarettes    Smokeless tobacco: Never   Substance Use Topics    Alcohol use: Yes     Comment: OCCASIONAL    Drug use: Never     Family History   Problem Relation Name Age of Onset    No Known Problems Mother      No Known Problems Father       No Known Allergies  Current Outpatient Medications   Medication Instructions    amLODIPine (NORVASC) 2.5 mg, oral, Daily    apixaban (Eliquis) 5 mg tablet 1 tablet, oral, 2 times daily    calcium citrate-vitamin D2 250 mg-2.5 mcg (100 unit) tablet 1 tablet, oral, 2 times daily    cyanocobalamin (Vitamin B-12) 500 mcg tablet 1 tablet, oral, Daily    dilTIAZem XR (DILACOR XR) 240 mg, oral, Daily    levothyroxine (Synthroid, Levoxyl) 50 mcg tablet 1 tablet, oral, Daily    loteprednol (Lotemax) 0.5 % ophthalmic suspension ophthalmic (eye), PRN     metoprolol succinate XL  (Toprol-XL) 50 mg 24 hr tablet 1 tablet, oral, Daily    nitroglycerin (NITROSTAT) 0.4 mg, sublingual, Every 5 min PRN    pantoprazole (PROTONIX) 20 mg, oral, Daily    rosuvastatin (CRESTOR) 40 mg, oral, Daily    tamsulosin (FLOMAX) 0.4 mg, oral, Daily    thiamine (Vitamin B-1) 100 mg tablet 1 tablet, oral, Daily

## 2024-04-11 ENCOUNTER — APPOINTMENT (OUTPATIENT)
Dept: NEUROSURGERY | Facility: HOSPITAL | Age: 84
End: 2024-04-11
Payer: MEDICARE

## 2024-04-15 ENCOUNTER — APPOINTMENT (OUTPATIENT)
Dept: NEUROSURGERY | Facility: HOSPITAL | Age: 84
End: 2024-04-15
Payer: MEDICARE

## 2024-04-19 DIAGNOSIS — E03.9 ACQUIRED HYPOTHYROIDISM: Primary | ICD-10-CM

## 2024-04-19 DIAGNOSIS — I48.20 CHRONIC ATRIAL FIBRILLATION (MULTI): Primary | ICD-10-CM

## 2024-04-19 DIAGNOSIS — I10 ESSENTIAL HYPERTENSION: ICD-10-CM

## 2024-04-19 RX ORDER — METOPROLOL SUCCINATE 50 MG/1
50 TABLET, EXTENDED RELEASE ORAL DAILY
Qty: 90 TABLET | Refills: 3 | Status: SHIPPED | OUTPATIENT
Start: 2024-04-19

## 2024-04-19 RX ORDER — AMLODIPINE BESYLATE 2.5 MG/1
2.5 TABLET ORAL DAILY
Qty: 90 TABLET | Refills: 3 | Status: SHIPPED | OUTPATIENT
Start: 2024-04-19

## 2024-04-19 RX ORDER — APIXABAN 5 MG/1
5 TABLET, FILM COATED ORAL 2 TIMES DAILY
Qty: 180 TABLET | Refills: 3 | Status: SHIPPED | OUTPATIENT
Start: 2024-04-19

## 2024-04-22 RX ORDER — LEVOTHYROXINE SODIUM 50 UG/1
50 TABLET ORAL DAILY
Qty: 90 TABLET | Refills: 3 | Status: SHIPPED | OUTPATIENT
Start: 2024-04-22

## 2024-04-30 ENCOUNTER — APPOINTMENT (OUTPATIENT)
Dept: NEUROSURGERY | Facility: HOSPITAL | Age: 84
End: 2024-04-30
Payer: MEDICARE

## 2024-04-30 ENCOUNTER — TELEPHONE (OUTPATIENT)
Dept: PRIMARY CARE | Facility: CLINIC | Age: 84
End: 2024-04-30
Payer: MEDICARE

## 2024-04-30 DIAGNOSIS — D35.2 PITUITARY MACROADENOMA (MULTI): ICD-10-CM

## 2024-04-30 DIAGNOSIS — Z86.73 CEREBELLAR CEREBROVASCULAR ACCIDENT (CVA) WITHOUT LATE EFFECT: ICD-10-CM

## 2024-04-30 DIAGNOSIS — R26.0 ATAXIC GAIT: Primary | ICD-10-CM

## 2024-05-28 ENCOUNTER — APPOINTMENT (OUTPATIENT)
Dept: PRIMARY CARE | Facility: CLINIC | Age: 84
End: 2024-05-28
Payer: MEDICARE

## 2024-05-28 ENCOUNTER — OFFICE VISIT (OUTPATIENT)
Dept: NEUROSURGERY | Facility: HOSPITAL | Age: 84
End: 2024-05-28
Payer: MEDICARE

## 2024-05-28 VITALS
HEIGHT: 70 IN | SYSTOLIC BLOOD PRESSURE: 132 MMHG | TEMPERATURE: 97.8 F | DIASTOLIC BLOOD PRESSURE: 75 MMHG | BODY MASS INDEX: 29.35 KG/M2 | HEART RATE: 59 BPM | WEIGHT: 205.03 LBS

## 2024-05-28 DIAGNOSIS — D35.2 PITUITARY MACROADENOMA (MULTI): Primary | ICD-10-CM

## 2024-05-28 PROCEDURE — 1125F AMNT PAIN NOTED PAIN PRSNT: CPT | Performed by: NEUROLOGICAL SURGERY

## 2024-05-28 PROCEDURE — 99213 OFFICE O/P EST LOW 20 MIN: CPT | Performed by: NEUROLOGICAL SURGERY

## 2024-05-28 PROCEDURE — 1159F MED LIST DOCD IN RCRD: CPT | Performed by: NEUROLOGICAL SURGERY

## 2024-05-28 PROCEDURE — 1036F TOBACCO NON-USER: CPT | Performed by: NEUROLOGICAL SURGERY

## 2024-05-28 PROCEDURE — 3078F DIAST BP <80 MM HG: CPT | Performed by: NEUROLOGICAL SURGERY

## 2024-05-28 PROCEDURE — 3075F SYST BP GE 130 - 139MM HG: CPT | Performed by: NEUROLOGICAL SURGERY

## 2024-05-28 PROCEDURE — 99203 OFFICE O/P NEW LOW 30 MIN: CPT | Performed by: NEUROLOGICAL SURGERY

## 2024-05-28 ASSESSMENT — PAIN SCALES - GENERAL: PAINLEVEL: 4

## 2024-05-28 NOTE — PROGRESS NOTES
"[facility]  Neurosurgery    History of Present Illness      Alfredo Quigley is an 83-year-old male with a PMH significant for atrial fibrillation (Eliquis), HTN, HLD, AAA, hypothyroidism, BPH, anemia of chronic disease, CKD stage III, CVA, ischemic optic neuropathy, with a known pituitary macro adenoma that was initially diagnosed in 2011. Has not been seen / established with neurosurgery. Patient with R eye blindness and poor peripheral vision. Was previously following with Dr. Salazar and Dr. Whiting of ophthalmology but unknown date of last visit. 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 MRI in 2018 with 15 x 23mm pituitary macroadenoma with encroachment into the R cavernous sinus. His most recent MRI without contrast demonstrated a 2.4 x 2.7 x 1.4cm homogenously enhancing sellar mass on T2 with slight suprasellar involvement.. Given concerns for growth patient was referred to neurosurgery for NPV.     Patient reports his primary complaint is of occipital headache which occurs at night time; resolves over time. No new visual complaints    H/o stroke 2019 with resiual l hemiparesis, can walk with walker      Objective      Vitals:   /75 (BP Location: Left arm, Patient Position: Sitting, BP Cuff Size: Adult)   Pulse 59   Temp 36.6 °C (97.8 °F)   Ht 1.778 m (5' 10\")   Wt 93 kg (205 lb 0.4 oz)   BMI 29.42 kg/m²         Physical Exam:    Awake and alert  L hemiparesis  Speech fluent  Full EOM  Reports no/poor vision R eye  In wheelchair      Relevant Results:    MRI 3/16/24 - sellar lesion extending inferiorly c/w pituitary adenoma, with suprasellar extension but no evident contact/compression or elevation of optic apparatus        Assessment & Plan      Diagnosis:  Diagnoses and all orders for this visit:  Pituitary macroadenoma (Multi)  -     Referral to " Neurosurgery  -     Referral to Endocrinology; Future  -     Referral to Ophthalmology; Future  -     Creatinine; Future  -     MR sella w and wo IV contrast; Future        Provider Impression:   Patient with known pituitary adenoma, with evidence of small amount of radiographic growth compared to MRI 6 years prior. No optic apparatus compression -his visual deficits appear not to be attributable to the adenoma, nor his occiptial headaches.  Given his co-morbidities he would be a substantially high risk for surgical  intervention. Given size and proximity to optic, would not recommend radiation at this time either.   Recommend baseline ophthalmological and endocrine evaluation and monitoring  F/U MRI sella protocol in one year.    Total time for this visit was 30 minutes.    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    Ischemic optic neuropathy    Old myocardial infarction 06/18/2020    History of myocardial infarction    Personal history of diseases of the skin and subcutaneous tissue 12/03/2019    History of cellulitis    Personal history of other diseases of the circulatory system     History of atrial fibrillation    Personal history of other specified conditions     History of breast lump    Pleural effusion on left 02/13/2023    Pure hypercholesterolemia, unspecified     High cholesterol    Renal insufficiency 09/15/2015    Vitamin D deficiency 09/15/2015     Past Surgical History:   Procedure Laterality Date    AV NODE ABLATION  11/10/2017    Catheter Ablation Atrial Fibrillation    OTHER SURGICAL HISTORY  05/17/2019     Cardiac catheterization with stent placement     Social History     Tobacco Use    Smoking status: Former     Types: Cigarettes    Smokeless tobacco: Never   Substance Use Topics    Alcohol use: Yes     Comment: OCCASIONAL    Drug use: Never     Family History   Problem Relation Name Age of Onset    No Known Problems Mother      No Known Problems Father       No Known Allergies  Current Outpatient Medications   Medication Instructions    amLODIPine (NORVASC) 2.5 mg, oral, Daily    calcium citrate-vitamin D2 250 mg-2.5 mcg (100 unit) tablet 1 tablet, oral, 2 times daily    cyanocobalamin (Vitamin B-12) 500 mcg tablet 1 tablet, oral, Daily    dilTIAZem XR (DILACOR XR) 240 mg, oral, Daily    Eliquis 5 mg, oral, 2 times daily    levothyroxine (SYNTHROID, LEVOXYL) 50 mcg, oral, Daily, as directed    loteprednol (Lotemax) 0.5 % ophthalmic suspension ophthalmic (eye), PRN     metoprolol succinate XL (TOPROL-XL) 50 mg, oral, Daily    nitroglycerin (NITROSTAT) 0.4 mg, sublingual, Every 5 min PRN    pantoprazole (PROTONIX) 20 mg, oral, Daily    rosuvastatin (CRESTOR) 40 mg, oral, Daily    tamsulosin (FLOMAX) 0.4 mg, oral, Daily    thiamine (Vitamin B-1) 100 mg tablet 1 tablet, oral, Daily

## 2024-06-11 ENCOUNTER — OFFICE VISIT (OUTPATIENT)
Dept: OPHTHALMOLOGY | Facility: CLINIC | Age: 84
End: 2024-06-11
Payer: MEDICARE

## 2024-06-11 DIAGNOSIS — H25.811 COMBINED FORMS OF AGE-RELATED CATARACT, RIGHT EYE: ICD-10-CM

## 2024-06-11 DIAGNOSIS — H47.012 NON-ARTERITIC ANTERIOR ISCHEMIC OPTIC NEUROPATHY OF LEFT EYE: ICD-10-CM

## 2024-06-11 DIAGNOSIS — D35.2 PITUITARY MACROADENOMA (MULTI): ICD-10-CM

## 2024-06-11 DIAGNOSIS — H53.8 BLURRED VISION: Primary | ICD-10-CM

## 2024-06-11 PROCEDURE — 92004 COMPRE OPH EXAM NEW PT 1/>: CPT | Performed by: OPHTHALMOLOGY

## 2024-06-11 ASSESSMENT — REFRACTION_MANIFEST
OS_SPHERE: PLANO
METHOD_AUTOREFRACTION: 1
OD_ADD: +3.00
OS_ADD: +3.00
OS_AXIS: 030
OS_SPHERE: PLANO
OS_AXIS: 030
OS_CYLINDER: -0.25
OD_SPHERE: PLANO
OS_CYLINDER: -0.50
OD_SPHERE: UNABLE

## 2024-06-11 ASSESSMENT — VISUAL ACUITY
OS_SC: 20/30
OD_SC: LP
OS_PH_SC: 20/20
METHOD: SNELLEN - LINEAR

## 2024-06-11 ASSESSMENT — TONOMETRY
IOP_METHOD: GOLDMANN APPLANATION
OD_IOP_MMHG: 13
OS_IOP_MMHG: 14

## 2024-06-11 ASSESSMENT — EXTERNAL EXAM - LEFT EYE: OS_EXAM: NORMAL

## 2024-06-11 ASSESSMENT — EXTERNAL EXAM - RIGHT EYE: OD_EXAM: NORMAL

## 2024-06-11 ASSESSMENT — SLIT LAMP EXAM - LIDS
COMMENTS: NORMAL
COMMENTS: NORMAL

## 2024-06-11 ASSESSMENT — CUP TO DISC RATIO: OS_RATIO: 0.2

## 2024-06-11 NOTE — PROGRESS NOTES
Assessment/Plan   Diagnoses and all orders for this visit:  Blurred vision  -dense cataract right eye    Pituitary macroadenoma (Multi)  -     Referral to Ophthalmology  -will  get visual field (VF) testing at next visit    Combined forms of age-related cataract, right eye  -cataract surgery discussed  -request old records from previous Optometrist , Dr. 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

## 2024-06-18 ENCOUNTER — APPOINTMENT (OUTPATIENT)
Dept: PRIMARY CARE | Facility: CLINIC | Age: 84
End: 2024-06-18
Payer: MEDICARE

## 2024-07-05 DIAGNOSIS — Z86.73 CEREBELLAR CEREBROVASCULAR ACCIDENT (CVA) WITHOUT LATE EFFECT: ICD-10-CM

## 2024-07-08 RX ORDER — ROSUVASTATIN CALCIUM 40 MG/1
40 TABLET, COATED ORAL DAILY
Qty: 90 TABLET | Refills: 3 | Status: SHIPPED | OUTPATIENT
Start: 2024-07-08

## 2024-07-24 ENCOUNTER — APPOINTMENT (OUTPATIENT)
Dept: OPHTHALMOLOGY | Facility: CLINIC | Age: 84
End: 2024-07-24
Payer: MEDICARE

## 2024-08-15 DIAGNOSIS — K21.9 GASTROESOPHAGEAL REFLUX DISEASE WITHOUT ESOPHAGITIS: ICD-10-CM

## 2024-08-15 DIAGNOSIS — I48.91 ATRIAL FIBRILLATION, UNSPECIFIED TYPE (MULTI): ICD-10-CM

## 2024-08-15 RX ORDER — DILTIAZEM HYDROCHLORIDE 240 MG/1
240 CAPSULE, EXTENDED RELEASE ORAL DAILY
Qty: 90 CAPSULE | Refills: 3 | Status: SHIPPED | OUTPATIENT
Start: 2024-08-15 | End: 2025-08-15

## 2024-08-15 RX ORDER — PANTOPRAZOLE SODIUM 20 MG/1
20 TABLET, DELAYED RELEASE ORAL DAILY
Qty: 90 TABLET | Refills: 3 | Status: SHIPPED | OUTPATIENT
Start: 2024-08-15 | End: 2025-08-15

## 2024-08-16 ENCOUNTER — TELEPHONE (OUTPATIENT)
Dept: PRIMARY CARE | Facility: CLINIC | Age: 84
End: 2024-08-16
Payer: MEDICARE

## 2024-08-16 NOTE — TELEPHONE ENCOUNTER
dilTIAZem XR (Dilacor XR) 240 mg 24 hr capsule [[id number]]  No covered under insurance.     They are requesting that it be changed to; Cardizem, capsules

## 2024-08-19 DIAGNOSIS — I48.20 CHRONIC ATRIAL FIBRILLATION (MULTI): Primary | ICD-10-CM

## 2024-08-19 DIAGNOSIS — I10 ESSENTIAL HYPERTENSION: ICD-10-CM

## 2024-08-19 RX ORDER — DILTIAZEM HYDROCHLORIDE 240 MG/1
240 CAPSULE, COATED, EXTENDED RELEASE ORAL DAILY
Qty: 90 CAPSULE | Refills: 1 | Status: SHIPPED | OUTPATIENT
Start: 2024-08-19 | End: 2025-02-15

## 2024-08-19 NOTE — TELEPHONE ENCOUNTER
Dr. DAVILA are you able to change the script over to Cardizem 240mg capsules? [facility] wont take a verbal for change

## 2024-09-19 PROBLEM — U07.1 DISEASE DUE TO SEVERE ACUTE RESPIRATORY SYNDROME CORONAVIRUS 2 (SARS-COV-2): Status: ACTIVE | Noted: 2024-09-19

## 2024-09-19 PROBLEM — M72.0 DUPUYTREN'S CONTRACTURE: Status: ACTIVE | Noted: 2024-09-19

## 2024-09-30 ENCOUNTER — TELEPHONE (OUTPATIENT)
Dept: CARDIOLOGY | Facility: CLINIC | Age: 84
End: 2024-09-30
Payer: MEDICARE

## 2024-09-30 DIAGNOSIS — Z86.73 CEREBELLAR CEREBROVASCULAR ACCIDENT (CVA) WITHOUT LATE EFFECT: ICD-10-CM

## 2024-09-30 DIAGNOSIS — I48.20 CHRONIC ATRIAL FIBRILLATION (MULTI): ICD-10-CM

## 2024-09-30 DIAGNOSIS — N18.32 CHRONIC KIDNEY DISEASE, STAGE 3B (MULTI): ICD-10-CM

## 2024-09-30 DIAGNOSIS — I10 ESSENTIAL HYPERTENSION: ICD-10-CM

## 2024-09-30 DIAGNOSIS — J44.9 CHRONIC OBSTRUCTIVE PULMONARY DISEASE, UNSPECIFIED COPD TYPE (MULTI): ICD-10-CM

## 2024-09-30 NOTE — TELEPHONE ENCOUNTER
Zuri called in to  a copy of the labs order by Dr. Lynne from the last ov. Pt was last seen in 4/2023 and there are no orders placed and nothing stated in ov. Please advise and call Zuri back so she can  the order.

## 2024-10-01 ENCOUNTER — APPOINTMENT (OUTPATIENT)
Dept: PRIMARY CARE | Facility: CLINIC | Age: 84
End: 2024-10-01
Payer: MEDICARE

## 2024-10-01 VITALS
DIASTOLIC BLOOD PRESSURE: 72 MMHG | BODY MASS INDEX: 29.06 KG/M2 | SYSTOLIC BLOOD PRESSURE: 130 MMHG | HEART RATE: 60 BPM | WEIGHT: 203 LBS | OXYGEN SATURATION: 98 % | HEIGHT: 70 IN

## 2024-10-01 DIAGNOSIS — J44.9 CHRONIC OBSTRUCTIVE PULMONARY DISEASE, UNSPECIFIED COPD TYPE (MULTI): ICD-10-CM

## 2024-10-01 DIAGNOSIS — E03.9 ACQUIRED HYPOTHYROIDISM: ICD-10-CM

## 2024-10-01 DIAGNOSIS — D35.2 PITUITARY MACROADENOMA (MULTI): ICD-10-CM

## 2024-10-01 DIAGNOSIS — H47.012 NON-ARTERITIC ANTERIOR ISCHEMIC OPTIC NEUROPATHY OF LEFT EYE: ICD-10-CM

## 2024-10-01 DIAGNOSIS — Z00.00 MEDICARE ANNUAL WELLNESS VISIT, SUBSEQUENT: ICD-10-CM

## 2024-10-01 DIAGNOSIS — Z00.00 ROUTINE GENERAL MEDICAL EXAMINATION AT HEALTH CARE FACILITY: Primary | ICD-10-CM

## 2024-10-01 DIAGNOSIS — E78.5 HYPERLIPIDEMIA, ACQUIRED: ICD-10-CM

## 2024-10-01 DIAGNOSIS — I63.9 CEREBELLAR INFARCTION (MULTI): ICD-10-CM

## 2024-10-01 DIAGNOSIS — I10 ESSENTIAL HYPERTENSION: ICD-10-CM

## 2024-10-01 DIAGNOSIS — N18.32 CHRONIC KIDNEY DISEASE, STAGE 3B (MULTI): ICD-10-CM

## 2024-10-01 DIAGNOSIS — I48.20 CHRONIC ATRIAL FIBRILLATION (MULTI): ICD-10-CM

## 2024-10-01 DIAGNOSIS — I25.10 ASHD (ARTERIOSCLEROTIC HEART DISEASE): ICD-10-CM

## 2024-10-01 PROCEDURE — 1036F TOBACCO NON-USER: CPT | Performed by: INTERNAL MEDICINE

## 2024-10-01 PROCEDURE — 1170F FXNL STATUS ASSESSED: CPT | Performed by: INTERNAL MEDICINE

## 2024-10-01 PROCEDURE — 1124F ACP DISCUSS-NO DSCNMKR DOCD: CPT | Performed by: INTERNAL MEDICINE

## 2024-10-01 PROCEDURE — 1159F MED LIST DOCD IN RCRD: CPT | Performed by: INTERNAL MEDICINE

## 2024-10-01 PROCEDURE — 3078F DIAST BP <80 MM HG: CPT | Performed by: INTERNAL MEDICINE

## 2024-10-01 PROCEDURE — 99213 OFFICE O/P EST LOW 20 MIN: CPT | Performed by: INTERNAL MEDICINE

## 2024-10-01 PROCEDURE — G0439 PPPS, SUBSEQ VISIT: HCPCS | Performed by: INTERNAL MEDICINE

## 2024-10-01 PROCEDURE — 1160F RVW MEDS BY RX/DR IN RCRD: CPT | Performed by: INTERNAL MEDICINE

## 2024-10-01 PROCEDURE — 3075F SYST BP GE 130 - 139MM HG: CPT | Performed by: INTERNAL MEDICINE

## 2024-10-01 ASSESSMENT — ACTIVITIES OF DAILY LIVING (ADL)
MANAGING_FINANCES: NEEDS ASSISTANCE
BATHING: NEEDS ASSISTANCE
GROCERY_SHOPPING: NEEDS ASSISTANCE
DRESSING: NEEDS ASSISTANCE
TAKING_MEDICATION: NEEDS ASSISTANCE
DOING_HOUSEWORK: NEEDS ASSISTANCE

## 2024-10-01 ASSESSMENT — PATIENT HEALTH QUESTIONNAIRE - PHQ9
SUM OF ALL RESPONSES TO PHQ9 QUESTIONS 1 AND 2: 0
1. LITTLE INTEREST OR PLEASURE IN DOING THINGS: NOT AT ALL
2. FEELING DOWN, DEPRESSED OR HOPELESS: NOT AT ALL

## 2024-10-01 ASSESSMENT — ENCOUNTER SYMPTOMS
DEPRESSION: 0
OCCASIONAL FEELINGS OF UNSTEADINESS: 1
LOSS OF SENSATION IN FEET: 0

## 2024-10-01 NOTE — PROGRESS NOTES
"Subjective   Reason for Visit: Alfredo Quigley is an 84 y.o. male here for a Medicare Wellness visit.     Past Medical, Surgical, and Family History reviewed and updated in chart.    Reviewed all medications by prescribing practitioner or clinical pharmacist (such as prescriptions, OTCs, herbal therapies and supplements) and documented in the medical record.    HPI more headaches. Saw neurosurg and neuro. Mri w/ gadilinium done. Mass in pituitary fossa larger, enhancing and eroding some of the bony structures. Getting labs and seeing neuro in fu. Pt was told might need surgery. Otherwise ok.    Patient Care Team:  Jose Matos MD as PCP - General  [facility]     Review of Systems  As per  [facility]    Objective   Vitals:  /72   Pulse 60   Ht 1.778 m (5' 10\")   Wt 92.1 kg (203 lb)   SpO2 98%   BMI 29.13 kg/m²       Physical Exam  GEN nad, Affect wnl  Heent ncat, face symmetric, eyes chronically discordant  Skin good color  Resp breathing easily  No p/m retardation or agitation  Thinking appropriate  Oriented    Assessment & Plan  Medicare annual wellness visit, subsequent    Routine general medical examination at health care facility    Orders:    1 Year Follow Up In Primary Care - Wellness Exam; Future    Pituitary macroadenoma (Multi)     Chronic obstructive pulmonary disease, unspecified COPD type (Multi)    Cerebellar infarction (Multi)    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

## 2024-10-04 ENCOUNTER — LAB (OUTPATIENT)
Dept: LAB | Facility: LAB | Age: 84
End: 2024-10-04
Payer: MEDICARE

## 2024-10-04 DIAGNOSIS — I10 ESSENTIAL HYPERTENSION: ICD-10-CM

## 2024-10-04 PROCEDURE — 36415 COLL VENOUS BLD VENIPUNCTURE: CPT

## 2024-10-04 PROCEDURE — 80053 COMPREHEN METABOLIC PANEL: CPT

## 2024-10-04 PROCEDURE — 85027 COMPLETE CBC AUTOMATED: CPT

## 2024-10-05 LAB
ALBUMIN SERPL BCP-MCNC: 3.9 G/DL (ref 3.4–5)
ALP SERPL-CCNC: 94 U/L (ref 33–136)
ALT SERPL W P-5'-P-CCNC: 22 U/L (ref 10–52)
ANION GAP SERPL CALC-SCNC: 16 MMOL/L (ref 10–20)
AST SERPL W P-5'-P-CCNC: 16 U/L (ref 9–39)
BILIRUB SERPL-MCNC: 0.4 MG/DL (ref 0–1.2)
BUN SERPL-MCNC: 22 MG/DL (ref 6–23)
CALCIUM SERPL-MCNC: 9 MG/DL (ref 8.6–10.6)
CHLORIDE SERPL-SCNC: 107 MMOL/L (ref 98–107)
CO2 SERPL-SCNC: 24 MMOL/L (ref 21–32)
CREAT SERPL-MCNC: 2.04 MG/DL (ref 0.5–1.3)
EGFRCR SERPLBLD CKD-EPI 2021: 32 ML/MIN/1.73M*2
ERYTHROCYTE [DISTWIDTH] IN BLOOD BY AUTOMATED COUNT: 14.3 % (ref 11.5–14.5)
GLUCOSE SERPL-MCNC: 106 MG/DL (ref 74–99)
HCT VFR BLD AUTO: 32.7 % (ref 41–52)
HGB BLD-MCNC: 11 G/DL (ref 13.5–17.5)
MCH RBC QN AUTO: 27.2 PG (ref 26–34)
MCHC RBC AUTO-ENTMCNC: 33.6 G/DL (ref 32–36)
MCV RBC AUTO: 81 FL (ref 80–100)
NRBC BLD-RTO: 0 /100 WBCS (ref 0–0)
PLATELET # BLD AUTO: 231 X10*3/UL (ref 150–450)
POTASSIUM SERPL-SCNC: 4 MMOL/L (ref 3.5–5.3)
PROT SERPL-MCNC: 6.6 G/DL (ref 6.4–8.2)
RBC # BLD AUTO: 4.04 X10*6/UL (ref 4.5–5.9)
SODIUM SERPL-SCNC: 143 MMOL/L (ref 136–145)
WBC # BLD AUTO: 6.5 X10*3/UL (ref 4.4–11.3)

## 2024-10-11 ENCOUNTER — APPOINTMENT (OUTPATIENT)
Dept: CARDIOLOGY | Facility: CLINIC | Age: 84
End: 2024-10-11
Payer: MEDICARE

## 2024-10-16 ENCOUNTER — APPOINTMENT (OUTPATIENT)
Dept: OPHTHALMOLOGY | Facility: CLINIC | Age: 84
End: 2024-10-16
Payer: MEDICARE

## 2024-10-18 ENCOUNTER — APPOINTMENT (OUTPATIENT)
Dept: ENDOCRINOLOGY | Facility: CLINIC | Age: 84
End: 2024-10-18
Payer: MEDICARE

## 2024-10-18 VITALS
BODY MASS INDEX: 31.5 KG/M2 | DIASTOLIC BLOOD PRESSURE: 52 MMHG | HEIGHT: 70 IN | SYSTOLIC BLOOD PRESSURE: 120 MMHG | WEIGHT: 220 LBS

## 2024-10-18 DIAGNOSIS — D35.2 PITUITARY MACROADENOMA (MULTI): ICD-10-CM

## 2024-10-18 PROCEDURE — 1036F TOBACCO NON-USER: CPT | Performed by: INTERNAL MEDICINE

## 2024-10-18 PROCEDURE — 3074F SYST BP LT 130 MM HG: CPT | Performed by: INTERNAL MEDICINE

## 2024-10-18 PROCEDURE — 99204 OFFICE O/P NEW MOD 45 MIN: CPT | Performed by: INTERNAL MEDICINE

## 2024-10-18 PROCEDURE — 3078F DIAST BP <80 MM HG: CPT | Performed by: INTERNAL MEDICINE

## 2024-10-18 PROCEDURE — 1159F MED LIST DOCD IN RCRD: CPT | Performed by: INTERNAL MEDICINE

## 2024-10-18 PROCEDURE — 1160F RVW MEDS BY RX/DR IN RCRD: CPT | Performed by: INTERNAL MEDICINE

## 2024-10-18 ASSESSMENT — ENCOUNTER SYMPTOMS
CHILLS: 0
VOMITING: 0
NAUSEA: 0
SHORTNESS OF BREATH: 0
DIZZINESS: 0
DIARRHEA: 0
FEVER: 0
LIGHT-HEADEDNESS: 0

## 2024-10-18 NOTE — LETTER
October 18, 2024     CHULA Skinner-CIRO  [address]  Department Of Neurological Surgery  [facility] [address]    Patient: Alfredo Quigley   YOB: 1940   Date of Visit: 10/18/2024       Dear Dr. Christine Lloyd, CHULA-CNP:    Thank you for referring Alfredo Quigley to me for evaluation. Below are my notes for this consultation.  If you have questions, please do not hesitate to call me. I look forward to following your patient along with you.       Sincerely,     Samira Santo MD      CC: oJse Matos MD  ______________________________________________________________________________________    Endocrinology New Patient Consult  Subjective  Patient ID: Alfredo Quigley is a 84 y.o. male who presents for Pituitary Problem. Patient was referred by Christine Lloyd CNP.       PCP: Jose Matos MD    HPI  I performed this visit using real-time telehealth tools, including an audio/video connection between the patient at their home and Dr Samira Santo [place], [place].    84-year-old referred by Dr. 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  On review of his chart he was initially diagnosed with a macroadenoma back in 2011.  Limited notes are accessible.  It appears he saw a endocrinologist in that timeframe for a year or 2 and then was lost to follow-up.  More recently he was referred to neurosurgery in May due to concern of growth of his pituitary macroadenoma.  Neurosurgery referred him to ophthalmology and endocrinology but determined him to be a poor surgical candidate for resection and decided on monitoring only.  He reports having hormonal blood work with a neurologist?  This provider is out of system and we will obtain his results.  He complains of a occipital headache that he has had on and off for quite some time.  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  He has been sleeping okay.  His weight has been stable.  He has had hypothyroidism for quite some time  stable on levothyroxine.  On review of past notes he was noted to have pre-existing hypothyroidism in 2016 as well as hypogonadism.  He denies being on testosterone treatment in the past      Review of Systems   Constitutional:  Negative for chills and fever.   Respiratory:  Negative for shortness of breath.    Gastrointestinal:  Negative for diarrhea, nausea and vomiting.   Endocrine: Negative for cold intolerance and heat intolerance.   Neurological:  Negative for dizziness and light-headedness.       Patient Active Problem List   Diagnosis   • AAA (abdominal aortic aneurysm) (CMS-[facility])   • Abnormal CBC   • Abnormal CXR   • Acquired hypothyroidism   • Chronic atrial fibrillation (Multi)   • ASHD (arteriosclerotic heart disease)   • Ataxic gait   • BPH with obstruction/lower urinary tract symptoms   • Cataract, mature   • Cerebellar cerebrovascular accident (CVA) without late effect   • Chronic kidney disease, stage 3b (Multi)   • Esophageal reflux   • Essential hypertension   • Hyperlipidemia, acquired   • NAION (non-arteritic anterior ischemic optic neuropathy)   • Other fatigue   • Recurrent UTI   • Visual field defect   • Cerebellar infarction (Multi)   • COPD (chronic obstructive pulmonary disease) (Multi)   • Hypogonadism in male   • Kidney stones   • Paroxysmal atrial tachycardia (CMS-HCC)   • Pituitary macroadenoma (Multi)   • Dupuytren's contracture   • Disease due to severe acute respiratory syndrome coronavirus 2 (SARS-CoV-2)       Past Medical History:   Diagnosis Date   • Acute ST elevation myocardial infarction (STEMI) (Multi) 04/12/2019   • Alcohol abuse 04/12/2019   • Benign neoplasm of pituitary gland (Multi)     Pituitary adenoma   • Bilateral dry eyes 02/13/2023   • BPH with elevated PSA 07/11/2023   • Depressive disorder 02/13/2023   • Disorder of thyroid, unspecified     Thyroid trouble   • Ischemic optic neuropathy, 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    Ischemic optic neuropathy   • Old myocardial  infarction 06/18/2020    History of myocardial infarction   • Personal history of diseases of the skin and subcutaneous tissue 12/03/2019    History of cellulitis   • Personal history of other diseases of the circulatory system     History of atrial fibrillation   • Personal history of other specified conditions     History of breast lump   • Pleural effusion on left 02/13/2023   • Pure hypercholesterolemia, unspecified     High cholesterol   • Renal insufficiency 09/15/2015   • Vitamin D deficiency 09/15/2015        Past Surgical History:   Procedure Laterality Date   • AV NODE ABLATION  11/10/2017    Catheter Ablation Atrial Fibrillation   • OTHER SURGICAL HISTORY  05/17/2019    Cardiac catheterization with stent placement        Social History     Tobacco Use   Smoking Status Former   • Types: Cigarettes   Smokeless Tobacco Never      Social History     Substance and Sexual Activity   Alcohol Use Yes    Comment: OCCASIONAL      Marital status:   Employment: Retired    Family History   Problem Relation Name Age of Onset   • No Known Problems Mother     • No Known Problems Father          Home Meds:  Current Outpatient Medications   Medication Instructions   • amLODIPine (NORVASC) 2.5 mg, oral, Daily   • calcium citrate-vitamin D2 250 mg-2.5 mcg (100 unit) tablet 1 tablet, 2 times daily   • cyanocobalamin (Vitamin B-12) 500 mcg tablet 1 tablet, Daily   • dilTIAZem CD (CARDIZEM CD) 240 mg, oral, Daily   • Eliquis 5 mg, oral, 2 times daily   • levothyroxine (SYNTHROID, LEVOXYL) 50 mcg, oral, Daily, as directed   • loteprednol (Lotemax) 0.5 % ophthalmic suspension Administer into affected eye(s). PRN   • metoprolol succinate XL (TOPROL-XL) 50 mg, oral, Daily   • nitroglycerin (NITROSTAT) 0.4 mg, sublingual, Every 5 min PRN   • pantoprazole (PROTONIX) 20 mg, oral, Daily   • rosuvastatin (CRESTOR) 40 mg, oral, Daily   • tamsulosin (FLOMAX) 0.4 mg, oral, Daily   • thiamine (Vitamin B-1) 100 mg tablet 1 tablet,  "Daily        No Known Allergies     Objective  Vitals:    10/18/24 1453   BP: 120/52      Vitals:    10/18/24 1453   Weight: 99.8 kg (220 lb)      Body mass index is 31.57 kg/m².   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  I discussed with the wife and the patient today his course.  They have no recollection of this previous diagnosis but we did go over some of his old records and pituitary macroadenoma's in detail.  Neurosurgery feels he is a poor surgical candidate given his comorbidities.  I would recommend full hormonal evaluation and we will obtain his previous lab work results which were done by?  Neurology.  Based on what was done we may or may not need to add additional blood work.  Consideration would be for testosterone treatment to prevent/treat osteoporosis.  Based on blood work he may need treatment and a bone density.  I encouraged him to continue to follow-up with his ophthalmologist.  We will call him when we receive his blood work and go from there as far as further evaluation and treatment    Electronically signed by:  Samira Santo MD 10/18/24  2:54 PM    "

## 2024-10-18 NOTE — PROGRESS NOTES
Endocrinology New Patient Consult  Subjective   Patient ID: Alfredo Quigley is a 84 y.o. male who presents for Pituitary Problem. Patient was referred by Christine Lloyd CNP.       PCP: Jose Matos MD    HPI  I performed this visit using real-time telehealth tools, including an audio/video connection between the patient at their home and Dr Samira Santo [place], [place].    84-year-old referred by Dr. 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  On review of his chart he was initially diagnosed with a macroadenoma back in 2011.  Limited notes are accessible.  It appears he saw a endocrinologist in that timeframe for a year or 2 and then was lost to follow-up.  More recently he was referred to neurosurgery in May due to concern of growth of his pituitary macroadenoma.  Neurosurgery referred him to ophthalmology and endocrinology but determined him to be a poor surgical candidate for resection and decided on monitoring only.  He reports having hormonal blood work with a neurologist?  This provider is out of system and we will obtain his results.  He complains of a occipital headache that he has had on and off for quite some time.  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  He has been sleeping okay.  His weight has been stable.  He has had hypothyroidism for quite some time stable on levothyroxine.  On review of past notes he was noted to have pre-existing hypothyroidism in 2016 as well as hypogonadism.  He denies being on testosterone treatment in the past      Review of Systems   Constitutional:  Negative for chills and fever.   Respiratory:  Negative for shortness of breath.    Gastrointestinal:  Negative for diarrhea, nausea and vomiting.   Endocrine: Negative for cold intolerance and heat intolerance.   Neurological:  Negative for dizziness and light-headedness.       Patient Active Problem List   Diagnosis    AAA (abdominal aortic aneurysm) (CMS-[facility])    Abnormal CBC    Abnormal CXR     Acquired hypothyroidism    Chronic atrial fibrillation (Multi)    ASHD (arteriosclerotic heart disease)    Ataxic gait    BPH with obstruction/lower urinary tract symptoms    Cataract, mature    Cerebellar cerebrovascular accident (CVA) without late effect    Chronic kidney disease, stage 3b (Multi)    Esophageal reflux    Essential hypertension    Hyperlipidemia, acquired    NAION (non-arteritic anterior ischemic optic neuropathy)    Other fatigue    Recurrent UTI    Visual field defect    Cerebellar infarction (Multi)    COPD (chronic obstructive pulmonary disease) (Multi)    Hypogonadism in male    Kidney stones    Paroxysmal atrial tachycardia (CMS-HCC)    Pituitary macroadenoma (Multi)    Dupuytren's contracture    Disease due to severe acute respiratory syndrome coronavirus 2 (SARS-CoV-2)       Past Medical History:   Diagnosis Date    Acute ST elevation myocardial infarction (STEMI) (Multi) 04/12/2019    Alcohol abuse 04/12/2019    Benign neoplasm of pituitary gland (Multi)     Pituitary adenoma    Bilateral dry eyes 02/13/2023    BPH with elevated PSA 07/11/2023    Depressive disorder 02/13/2023    Disorder of thyroid, unspecified     Thyroid trouble    Ischemic optic neuropathy, 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    Ischemic optic neuropathy    Old myocardial infarction 06/18/2020    History of myocardial infarction    Personal history of diseases of the skin and subcutaneous tissue 12/03/2019    History of cellulitis    Personal history of other diseases of the circulatory system     History of atrial fibrillation    Personal history of other specified conditions     History of breast lump    Pleural effusion on left 02/13/2023    Pure hypercholesterolemia, unspecified     High cholesterol    Renal insufficiency 09/15/2015    Vitamin D deficiency 09/15/2015        Past Surgical History:   Procedure Laterality Date    AV NODE ABLATION  11/10/2017    Catheter Ablation Atrial Fibrillation    OTHER SURGICAL HISTORY   "05/17/2019    Cardiac catheterization with stent placement        Social History     Tobacco Use   Smoking Status Former    Types: Cigarettes   Smokeless Tobacco Never      Social History     Substance and Sexual Activity   Alcohol Use Yes    Comment: OCCASIONAL      Marital status:   Employment: Retired    Family History   Problem Relation Name Age of Onset    No Known Problems Mother      No Known Problems Father          Home Meds:  Current Outpatient Medications   Medication Instructions    amLODIPine (NORVASC) 2.5 mg, oral, Daily    calcium citrate-vitamin D2 250 mg-2.5 mcg (100 unit) tablet 1 tablet, 2 times daily    cyanocobalamin (Vitamin B-12) 500 mcg tablet 1 tablet, Daily    dilTIAZem CD (CARDIZEM CD) 240 mg, oral, Daily    Eliquis 5 mg, oral, 2 times daily    levothyroxine (SYNTHROID, LEVOXYL) 50 mcg, oral, Daily, as directed    loteprednol (Lotemax) 0.5 % ophthalmic suspension Administer into affected eye(s). PRN    metoprolol succinate XL (TOPROL-XL) 50 mg, oral, Daily    nitroglycerin (NITROSTAT) 0.4 mg, sublingual, Every 5 min PRN    pantoprazole (PROTONIX) 20 mg, oral, Daily    rosuvastatin (CRESTOR) 40 mg, oral, Daily    tamsulosin (FLOMAX) 0.4 mg, oral, Daily    thiamine (Vitamin B-1) 100 mg tablet 1 tablet, Daily        No Known Allergies     Objective   Vitals:    10/18/24 1453   BP: 120/52      Vitals:    10/18/24 1453   Weight: 99.8 kg (220 lb)      Body mass index is 31.57 kg/m².   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  I discussed with the wife and the patient today his course.  They have no recollection of this previous diagnosis but we did go over some of his old records and pituitary macroadenoma's in detail.  " Neurosurgery feels he is a poor surgical candidate given his comorbidities.  I would recommend full hormonal evaluation and we will obtain his previous lab work results which were done by?  Neurology.  Based on what was done we may or may not need to add additional blood work.  Consideration would be for testosterone treatment to prevent/treat osteoporosis.  Based on blood work he may need treatment and a bone density.  I encouraged him to continue to follow-up with his ophthalmologist.  We will call him when we receive his blood work and go from there as far as further evaluation and treatment    Electronically signed by:  Samira Santo MD 10/18/24  2:54 PM

## 2024-10-18 NOTE — ASSESSMENT & PLAN NOTE
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  I discussed with the wife and the patient today his course.  They have no recollection of this previous diagnosis but we did go over some of his old records and pituitary macroadenoma's in detail.  Neurosurgery feels he is a poor surgical candidate given his comorbidities.  I would recommend full hormonal evaluation and we will obtain his previous lab work results which were done by?  Neurology.  Based on what was done we may or may not need to add additional blood work.  Consideration would be for testosterone treatment to prevent/treat osteoporosis.  Based on blood work he may need treatment and a bone density.  I encouraged him to continue to follow-up with his ophthalmologist.  We will call him when we receive his blood work and go from there as far as further evaluation and treatment

## 2024-10-19 NOTE — PATIENT INSTRUCTIONS
We will contact the outside hospital to obtain your hormonal lab work  Further testing based on those results  Please call or message with any concerns or questions

## 2024-10-22 DIAGNOSIS — E29.1 HYPOGONADISM IN MALE: ICD-10-CM

## 2024-10-22 DIAGNOSIS — D35.2 PITUITARY MACROADENOMA (MULTI): Primary | ICD-10-CM

## 2024-11-08 ENCOUNTER — APPOINTMENT (OUTPATIENT)
Dept: OPHTHALMOLOGY | Facility: CLINIC | Age: 84
End: 2024-11-08
Payer: MEDICARE

## 2024-11-15 ENCOUNTER — OFFICE VISIT (OUTPATIENT)
Dept: CARDIOLOGY | Facility: CLINIC | Age: 84
End: 2024-11-15
Payer: MEDICARE

## 2024-11-15 VITALS — HEART RATE: 61 BPM | DIASTOLIC BLOOD PRESSURE: 49 MMHG | SYSTOLIC BLOOD PRESSURE: 129 MMHG | OXYGEN SATURATION: 98 %

## 2024-11-15 DIAGNOSIS — I10 ESSENTIAL HYPERTENSION: ICD-10-CM

## 2024-11-15 DIAGNOSIS — I71.40 ABDOMINAL AORTIC ANEURYSM (AAA) WITHOUT RUPTURE, UNSPECIFIED PART (CMS-HCC): Primary | ICD-10-CM

## 2024-11-15 DIAGNOSIS — I47.19 PAROXYSMAL ATRIAL TACHYCARDIA (CMS-HCC): ICD-10-CM

## 2024-11-15 DIAGNOSIS — I48.20 CHRONIC ATRIAL FIBRILLATION (MULTI): ICD-10-CM

## 2024-11-15 DIAGNOSIS — I25.10 ASHD (ARTERIOSCLEROTIC HEART DISEASE): ICD-10-CM

## 2024-11-15 DIAGNOSIS — E78.5 HYPERLIPIDEMIA, ACQUIRED: ICD-10-CM

## 2024-11-15 LAB
ATRIAL RATE: 64 BPM
Q ONSET: 214 MS
QRS COUNT: 11 BEATS
QRS DURATION: 96 MS
QT INTERVAL: 450 MS
QTC CALCULATION(BAZETT): 464 MS
QTC FREDERICIA: 459 MS
R AXIS: -58 DEGREES
T AXIS: 73 DEGREES
T OFFSET: 439 MS
VENTRICULAR RATE: 64 BPM

## 2024-11-15 PROCEDURE — 93005 ELECTROCARDIOGRAM TRACING: CPT | Performed by: STUDENT IN AN ORGANIZED HEALTH CARE EDUCATION/TRAINING PROGRAM

## 2024-11-15 PROCEDURE — 99214 OFFICE O/P EST MOD 30 MIN: CPT | Performed by: STUDENT IN AN ORGANIZED HEALTH CARE EDUCATION/TRAINING PROGRAM

## 2024-11-15 PROCEDURE — 3074F SYST BP LT 130 MM HG: CPT | Performed by: STUDENT IN AN ORGANIZED HEALTH CARE EDUCATION/TRAINING PROGRAM

## 2024-11-15 PROCEDURE — 1159F MED LIST DOCD IN RCRD: CPT | Performed by: STUDENT IN AN ORGANIZED HEALTH CARE EDUCATION/TRAINING PROGRAM

## 2024-11-15 PROCEDURE — 3078F DIAST BP <80 MM HG: CPT | Performed by: STUDENT IN AN ORGANIZED HEALTH CARE EDUCATION/TRAINING PROGRAM

## 2024-11-15 RX ORDER — METOPROLOL SUCCINATE 50 MG/1
25 TABLET, EXTENDED RELEASE ORAL DAILY
Qty: 90 TABLET | Refills: 3 | Status: SHIPPED | OUTPATIENT
Start: 2024-11-15

## 2024-11-15 NOTE — PROGRESS NOTES
Chief Complaint:   No chief complaint on file.     History Of Present Illness:    Alfredo Quigley is a 84 y.o. male returns for follow-up appointment.  This was stable last year.  His most recent creatinine is 2.  Has not had any major bleeding.  He is on Eliquis 5 mg twice a day currently.  He is due for repeat screening of his abdominal aorta has not seen Dr. Luevano in 2 years.  He denies chest discomfort.  No falls.  Compliant with current medical therapy.  Vital signs are within normal limits.  His most recent labs are acceptable from our standpoint.     Last Recorded Vitals:  Vitals:    11/15/24 1103   BP: (!) 129/49   BP Location: Left arm   Patient Position: Sitting   Pulse: 61   SpO2: 98%       Review of Systems  ROS      Allergies:  Patient has no known allergies.    Outpatient Medications:  Current Outpatient Medications   Medication Instructions    amLODIPine (NORVASC) 2.5 mg, oral, Daily    apixaban (ELIQUIS) 2.5 mg, oral, 2 times daily    calcium citrate-vitamin D2 250 mg-2.5 mcg (100 unit) tablet 1 tablet, 2 times daily    cyanocobalamin (Vitamin B-12) 500 mcg tablet 1 tablet, Daily    dilTIAZem CD (CARDIZEM CD) 240 mg, oral, Daily    levothyroxine (SYNTHROID, LEVOXYL) 50 mcg, oral, Daily, as directed    loteprednol (Lotemax) 0.5 % ophthalmic suspension Administer into affected eye(s). PRN    metoprolol succinate XL (TOPROL-XL) 25 mg, oral, Daily    nitroglycerin (NITROSTAT) 0.4 mg, sublingual, Every 5 min PRN    pantoprazole (PROTONIX) 20 mg, oral, Daily    rosuvastatin (CRESTOR) 40 mg, oral, Daily    tamsulosin (FLOMAX) 0.4 mg, oral, Daily    thiamine (Vitamin B-1) 100 mg tablet 1 tablet, Daily       Physical Exam:  General: No acute distress,  A&O x3, in wheelchair, wife is present frail,  Skin: Warm and dry  Neck: JVD is not elevated  ENT: Moist mucous membranes no lesions appreciated  Pulmonary: CTAB  Cards: Regular rate rhythm, no murmurs gallops or rubs appreciated normal S1-S2  Abdomen: Soft  "nontender nondistended  Extremities: No edema or cyanosis  Psych: Appropriate mood and affect        Last Labs:  CBC -  Lab Results   Component Value Date    WBC 6.5 10/04/2024    HGB 11.0 (L) 10/04/2024    HCT 32.7 (L) 10/04/2024    MCV 81 10/04/2024     10/04/2024       CMP -  Lab Results   Component Value Date    CALCIUM 9.0 10/04/2024    PHOS 2.6 10/05/2023    PROT 6.6 10/04/2024    ALBUMIN 3.9 10/04/2024    AST 16 10/04/2024    ALT 22 10/04/2024    ALKPHOS 94 10/04/2024    BILITOT 0.4 10/04/2024       LIPID PANEL -   Lab Results   Component Value Date    CHOL 151 03/05/2024    TRIG 111 03/05/2024    HDL 39.8 03/05/2024    CHHDL 3.8 03/05/2024    LDLF 92 04/14/2023    VLDL 22 03/05/2024    NHDL 111 03/05/2024       RENAL FUNCTION PANEL -   Lab Results   Component Value Date    GLUCOSE 106 (H) 10/04/2024     10/04/2024    K 4.0 10/04/2024     10/04/2024    CO2 24 10/04/2024    ANIONGAP 16 10/04/2024    BUN 22 10/04/2024    CREATININE 2.04 (H) 10/04/2024    GFRMALE 32 (A) 04/14/2023    CALCIUM 9.0 10/04/2024    PHOS 2.6 10/05/2023    ALBUMIN 3.9 10/04/2024        No results found for: \"BNP\", \"HGBA1C\"    Last Cardiology Tests:  ECG:  No results found for this or any previous visit (from the past 4464 hours).     Echo:  No results found for this or any previous visit from the past 1095 days.     Assessment and Plan    1. CAD: Status post drug-eluting to the LAD and RCA in 2019. He is not having active angina symptoms today. He is on high intensity statin therapy as well as beta-blockade. Discontinue aspirin and continue Eliquis due to risk of major bleeding as patient has unstable gait and risk of falls.      Baseline ECG shows sinus rhythm first-degree AV block with old Q waves appreciated leads V1 and V2.     1 2. Paroxysmal atrial fibrillation: Recent episode of ARLET leos currently in sinus rhythm. He is anticoagulated with Eliquis. Patient's been advised to take additional metoprolol for episodes " of A. fib. If recurrent events continue to occur we will consider initiation of amiodarone to suppress A. fib.     3. AAA: Measured 4.5 x 4.2 cm in 2019. Most recent assessment of the abdominal aorta measures 4.9 x 4.8 cm.  Repeat AAA ultrasound ordered.  Patient encouraged to follow-up with vascular surgery.     4. Hypertension with underlying CKD: Well-controlled today with his current medication regimen.     5. History of CVA events: Unknown if this is related to atrial fibrillation or underlying atherosclerosis/thromboembolic event. Patient is on high intensity statin therapy as well as anticoagulation with Eliquis.     6. Hyperlipidemia: Treated rosuvastatin 40 mg daily.     Reduce metoprolol succinate to 25 g once a day  Reduce Eliquis 2.5 mg twice a day  AAA ultrasound  Follow-up in 1 year    (This note was generated with voice recognition software and may contain errors including spelling, grammar, syntax and missed recognition of what was dictated, of which may not have been fully corrected)     Cruzito Lynne MD PhD

## 2024-11-18 ENCOUNTER — HOSPITAL ENCOUNTER (OUTPATIENT)
Dept: VASCULAR MEDICINE | Facility: CLINIC | Age: 84
Discharge: HOME | End: 2024-11-18
Payer: MEDICARE

## 2024-11-18 DIAGNOSIS — I71.40 ABDOMINAL AORTIC ANEURYSM (AAA) WITHOUT RUPTURE, UNSPECIFIED PART (CMS-HCC): ICD-10-CM

## 2024-11-18 DIAGNOSIS — I10 ESSENTIAL HYPERTENSION: ICD-10-CM

## 2024-11-18 DIAGNOSIS — I48.20 CHRONIC ATRIAL FIBRILLATION (MULTI): ICD-10-CM

## 2024-11-18 PROCEDURE — 93979 VASCULAR STUDY: CPT | Performed by: SURGERY

## 2024-11-18 PROCEDURE — 93979 VASCULAR STUDY: CPT

## 2025-01-02 ENCOUNTER — APPOINTMENT (OUTPATIENT)
Dept: PRIMARY CARE | Facility: CLINIC | Age: 85
End: 2025-01-02
Payer: MEDICARE

## 2025-01-22 ENCOUNTER — APPOINTMENT (OUTPATIENT)
Dept: VASCULAR SURGERY | Facility: CLINIC | Age: 85
End: 2025-01-22
Payer: MEDICARE

## 2025-04-21 DIAGNOSIS — I48.20 CHRONIC ATRIAL FIBRILLATION (MULTI): ICD-10-CM

## 2025-04-21 DIAGNOSIS — I10 ESSENTIAL HYPERTENSION: ICD-10-CM

## 2025-04-22 RX ORDER — DILTIAZEM HYDROCHLORIDE 240 MG/1
240 CAPSULE, COATED, EXTENDED RELEASE ORAL DAILY
Qty: 90 CAPSULE | Refills: 1 | Status: SHIPPED | OUTPATIENT
Start: 2025-04-22 | End: 2025-10-19

## 2025-08-14 ENCOUNTER — TELEPHONE (OUTPATIENT)
Dept: PRIMARY CARE | Facility: CLINIC | Age: 85
End: 2025-08-14
Payer: MEDICARE

## 2025-08-22 DIAGNOSIS — D35.2 PITUITARY MACROADENOMA (MULTI): Primary | ICD-10-CM

## 2025-08-31 ENCOUNTER — HOSPITAL ENCOUNTER (OUTPATIENT)
Dept: RADIOLOGY | Facility: EXTERNAL LOCATION | Age: 85
Discharge: HOME | End: 2025-08-31
Payer: MEDICARE

## 2025-09-05 ENCOUNTER — APPOINTMENT (OUTPATIENT)
Dept: RADIOLOGY | Facility: CLINIC | Age: 85
End: 2025-09-05
Payer: OTHER GOVERNMENT

## 2025-10-01 ENCOUNTER — APPOINTMENT (OUTPATIENT)
Dept: PRIMARY CARE | Facility: CLINIC | Age: 85
End: 2025-10-01
Payer: MEDICARE

## 2025-11-07 ENCOUNTER — APPOINTMENT (OUTPATIENT)
Dept: CARDIOLOGY | Facility: CLINIC | Age: 85
End: 2025-11-07
Payer: MEDICARE